# Patient Record
Sex: FEMALE | Race: WHITE | Employment: FULL TIME | URBAN - METROPOLITAN AREA
[De-identification: names, ages, dates, MRNs, and addresses within clinical notes are randomized per-mention and may not be internally consistent; named-entity substitution may affect disease eponyms.]

---

## 2017-07-18 LAB — HPV ABSTRACT: NORMAL

## 2018-07-23 LAB — PHQ9 SCORE: 0

## 2018-07-30 ENCOUNTER — TRANSFERRED RECORDS (OUTPATIENT)
Dept: HEALTH INFORMATION MANAGEMENT | Facility: CLINIC | Age: 43
End: 2018-07-30

## 2018-08-13 ENCOUNTER — MEDICAL CORRESPONDENCE (OUTPATIENT)
Dept: HEALTH INFORMATION MANAGEMENT | Facility: CLINIC | Age: 43
End: 2018-08-13

## 2018-08-23 ENCOUNTER — OFFICE VISIT (OUTPATIENT)
Dept: SURGERY | Facility: CLINIC | Age: 43
End: 2018-08-23
Payer: COMMERCIAL

## 2018-08-23 VITALS
HEART RATE: 62 BPM | DIASTOLIC BLOOD PRESSURE: 90 MMHG | HEIGHT: 68 IN | BODY MASS INDEX: 30.31 KG/M2 | SYSTOLIC BLOOD PRESSURE: 132 MMHG | WEIGHT: 200 LBS

## 2018-08-23 DIAGNOSIS — L72.3 SEBACEOUS CYST: Primary | ICD-10-CM

## 2018-08-23 PROCEDURE — 99203 OFFICE O/P NEW LOW 30 MIN: CPT | Performed by: SURGERY

## 2018-08-23 NOTE — PROGRESS NOTES
"[facility]  Surgery Consultation    CONSULTATION REQUESTED BY:  Yon Vance [phone]    HPI: This patient is a 43-year-old female referred by the above-mentioned provider for consultation regarding right lateral breast lump.  She has had no imaging associated with this.  She  states that she first noted this on July 4 prior to leaving on vacation.  She noticed a lump in the right lateral breast area along her bra line.  She states that she has had this in the past on at least 2 other occasions were small lumps have risen in this area and ultimately gone away without any therapy.  This morning however gotten larger and more tender.  There is a question of infection and she was treated with some antibiotics.  She was seen and evaluated also by her chiropractor and this time whereby they expressed infected appearing material from this area.  Since then this is gotten much smaller.  She has had no fevers or chills.  No other significant symptoms.    PMH:   has a past medical history of Thyroid disease.  PSH:    has a past surgical history that includes Abdomen surgery.  Social History:   reports that she has never smoked. She has never used smokeless tobacco.  Family History:  family history includes Coronary Artery Disease in her paternal grandfather; Hyperlipidemia in her father and paternal grandfather; Hypertension in her father, maternal grandfather, and paternal grandfather.  Medications/Allergies: Home medications and allergies reviewed.    ROS:  The 10 point Review of Systems is negative other than noted in the HPI.    Physical Exam:  /90  Pulse 62  Ht 5' 8\" (1.727 m)  Wt 200 lb (90.7 kg)  BMI 30.41 kg/m2  GENERAL: Generally appears well.  Psych: Alert and Oriented.  Normal affect  Eyes: Sclera clear  Respiratory:  Lungs clear to ausculation bilaterally with good air excursion  Cardiovascular:  Regular Rate and Rhythm with no murmurs gallops or rubs, normal peripheral " pulses  Area in question was examined in the far right lateral chest wall in essence separate from her breast there appears to be a healing area of what was likely an inclusion cyst.  There is a very small 2-3 mm palpable nodule in the area consistent with a resolving cyst.  There is no erythema or cellulitis.  Integumentary:  No rashes  Neurological: grossly intact    All new lab and imaging data was reviewed.     Impression and Plan:  Patient is a 43 year old female with likely sebaceous cyst post drainage    PLAN: We discussed options.  I stated that this could simply be observed or we could proceed with excision.  She at this time is comfortable with simple observation.  She was encouraged to call back should she notice redevelopment of a more defined mass or enlargement of the area.    Thank you for the consultation    Willy Guerrero MD    Please route or send letter to:  Primary Care Provider (PCP) and Referring Provider

## 2018-08-23 NOTE — MR AVS SNAPSHOT
"              After Visit Summary   2018    Dominique Arias    MRN: [id number]           Patient Information     Date Of Birth          1975        Visit Information        Provider Department      2018 10:30 AM Willy Guerrero MD [facility] General Surgery      Care Instructions    Patient to follow up with Primary Care provider regarding elevated blood pressure.          Follow-ups after your visit        Who to contact     If you have questions or need follow up information about today's clinic visit or your schedule please contact [facility] directly at [phone].  Normal or non-critical lab and imaging results will be communicated to you by Tiragiuhart, letter or phone within 4 business days after the clinic has received the results. If you do not hear from us within 7 days, please contact the clinic through [employer] or phone. If you have a critical or abnormal lab result, we will notify you by phone as soon as possible.  Submit refill requests through [email or web address] or call your pharmacy and they will forward the refill request to us. Please allow 3 business days for your refill to be completed.          Additional Information About Your Visit        MyChart Information     [email or web address] lets you send messages to your doctor, view your test results, renew your prescriptions, schedule appointments and more. To sign up, go to [email or web address] . Click on \"Log in\" on the left side of the screen, which will take you to the Welcome page. Then click on \"Sign up Now\" on the right side of the page.     You will be asked to enter the access code listed below, as well as some personal information. Please follow the directions to create your username and password.     Your access code is: [email or web address]  Expires: 2018 11:01 AM     Your access code will  in 90 days. If you need help or a new code, please call your [facility] clinic or " "[phone].        Care EveryWhere ID     This is your Care EveryWhere ID. This could be used by other organizations to access your [place] medical records  [id number]        Your Vitals Were     Pulse Height BMI (Body Mass Index)             62 5' 8\" (1.727 m) 30.41 kg/m2          Blood Pressure from Last 3 Encounters:   08/23/18 132/90    Weight from Last 3 Encounters:   08/23/18 200 lb (90.7 kg)              Today, you had the following     No orders found for display       Primary Care Provider Office Phone # Fax #    Yon Abigail Vance [phone]       [facility] [place] [address] [address] [place] [address]        Equal Access to Services     LORIE ESQUIVEL : Hadii aad ku hadasho Soomaali, waaxda luqadaha, qaybta kaalmada adeegyada, waxay idiin hayaan marcia chase . So [facility] [phone].    ATENCIÓN: Si habla español, tiene a shelton disposición servicios gratuitos de asistencia lingüística. [facility] [phone].    We comply with applicable federal civil rights laws and [place] laws. We do not discriminate on the basis of race, color, national origin, age, disability, sex, sexual orientation, or gender identity.            Thank you!     Thank you for choosing [facility]  for your care. Our goal is always to provide you with excellent care. Hearing back from our patients is one way we can continue to improve our services. Please take a few minutes to complete the written survey that you may receive in the mail after your visit with us. Thank you!             Your Updated Medication List - Protect others around you: Learn how to safely use, store and throw away your medicines at www.disposemymeds.org.          This list is accurate as of 8/23/18 11:01 AM.  Always use your most recent med list.                   Brand Name Dispense Instructions for use Diagnosis    UNABLE TO FIND      MEDICATION NAME: Thyrosol        VITAMIN D (CHOLECALCIFEROL) PO      Take " by mouth daily

## 2018-08-23 NOTE — LETTER
"2018    RE: Dominique Arias, : 1975      CONSULTATION REQUESTED BY:  Yon Vance [phone]     HPI: This patient is a 43-year-old female referred by the above-mentioned provider for consultation regarding right lateral breast lump.  She has had no imaging associated with this.  She  states that she first noted this on  prior to leaving on vacation.  She noticed a lump in the right lateral breast area along her bra line.  She states that she has had this in the past on at least 2 other occasions were small lumps have risen in this area and ultimately gone away without any therapy.  This morning however gotten larger and more tender.  There is a question of infection and she was treated with some antibiotics.  She was seen and evaluated also by her chiropractor and this time whereby they expressed infected appearing material from this area.  Since then this is gotten much smaller.  She has had no fevers or chills.  No other significant symptoms.     PMH:   has a past medical history of Thyroid disease.  PSH:    has a past surgical history that includes Abdomen surgery.  Social History:   reports that she has never smoked. She has never used smokeless tobacco.  Family History:  family history includes Coronary Artery Disease in her paternal grandfather; Hyperlipidemia in her father and paternal grandfather; Hypertension in her father, maternal grandfather, and paternal grandfather.  Medications/Allergies: Home medications and allergies reviewed.     ROS:  The 10 point Review of Systems is negative other than noted in the HPI.     Physical Exam:  /90  Pulse 62  Ht 5' 8\" (1.727 m)  Wt 200 lb (90.7 kg)  BMI 30.41 kg/m2  GENERAL: Generally appears well.  Psych: Alert and Oriented.  Normal affect  Eyes: Sclera clear  Respiratory:  Lungs clear to ausculation bilaterally with good air excursion  Cardiovascular:  Regular Rate and Rhythm with no murmurs gallops or rubs, normal " peripheral pulses  Area in question was examined in the far right lateral chest wall in essence separate from her breast there appears to be a healing area of what was likely an inclusion cyst.  There is a very small 2-3 mm palpable nodule in the area consistent with a resolving cyst.  There is no erythema or cellulitis.  Integumentary:  No rashes  Neurological: grossly intact     All new lab and imaging data was reviewed.      Impression and Plan:  Patient is a 43 year old female with likely sebaceous cyst post drainage     PLAN: We discussed options.  I stated that this could simply be observed or we could proceed with excision.  She at this time is comfortable with simple observation.  She was encouraged to call back should she notice redevelopment of a more defined mass or enlargement of the area.     Thank you for the consultation     Willy Guerrero MD

## 2018-08-23 NOTE — NURSING NOTE
Breast Patients    BREAST PATIENTS (ALL)    1-Do you have any of the following symptoms? Breast Pain and Lump(s) or Mass(es)  2-In which breast are you having the symptoms? right  3-Do you use hormones?  No  4-Have you had a Mammogram? Yes  Where: [facility]  Date: 2016  5-Have you ever had a breast cyst drained? No  6-Have you ever had a breast biopsy? No  7-Have you ever had a Breast Cancer? No   8-Is there a history of Breast Cancer in your family? No  9-Have you ever had Ovarian Cancer? No  10-Is there a history of Ovarian Cancer in your family? No  11-Summarize your caffeine intake (i.e. coffee, tea, chocolate, soda etc.): 2 cups daily    BREAST PATIENTS (FEMALE)    12-What age did your periods begin? 12  13-Date your last menstrual period began? 8/18  14-Number of full-term pregnancies: 2  15-Your age when your first child was born? 32  16-Did you nurse your children? Yes  17-Are you pregnant now? No  18-Have you begun menopause? No  19-Have you had either ovary removed?No  20-Do you have breast implants? No

## 2019-02-01 LAB
ALBUMIN SERPL-MCNC: 4.5 G/DL (ref 3.6–5.1)
ALP SERPL-CCNC: 42 U/L (ref 33–115)
ALT SERPL-CCNC: 11 U/L (ref 6–29)
AST SERPL-CCNC: 16 U/L (ref 10–30)
BILIRUB SERPL-MCNC: 0.6 MG/DL (ref 0.2–1.2)
BUN SERPL-MCNC: 16 MG/DL (ref 7–25)
CALCIUM SERPL-MCNC: 9.2 MG/DL (ref 8.6–10.2)
CHLORIDE SERPLBLD-SCNC: 103 MMOL/L (ref 98–110)
CHOLEST SERPL-MCNC: 206 MG/DL
CO2 SERPL-SCNC: 29 MMOL/L (ref 20–32)
CREAT SERPL-MCNC: 0.86 MG/DL (ref 0.5–1.1)
GFR SERPL CREATININE-BSD FRML MDRD: 82 ML/MIN/1.73M2
GLUCOSE SERPL-MCNC: 87 MG/DL (ref 65–99)
HBA1C MFR BLD: 5.3 % (ref 0–5.7)
HDLC SERPL-MCNC: 61 MG/DL
HOMOCYSTINE: 7.6
LDLC SERPL CALC-MCNC: 120 MG/DL
NONHDLC SERPL-MCNC: 145 MG/DL
POTASSIUM SERPL-SCNC: 3.9 MMOL/L (ref 3.5–5.3)
PROT SERPL-MCNC: 6.9 G/DL (ref 6.1–8.1)
SODIUM SERPL-SCNC: 138 MMOL/L (ref 135–146)
TRIGL SERPL-MCNC: 142 MG/DL
TSH SERPL-ACNC: 18.98 MIU/L
VITAMIN D, 25-OH, TOTAL - QUEST: 56

## 2019-04-05 LAB — TSH SERPL-ACNC: 0.25 MIU/L

## 2019-05-21 ENCOUNTER — TRANSFERRED RECORDS (OUTPATIENT)
Dept: HEALTH INFORMATION MANAGEMENT | Facility: CLINIC | Age: 44
End: 2019-05-21

## 2019-05-21 ENCOUNTER — PRE VISIT (OUTPATIENT)
Dept: CARDIOLOGY | Facility: CLINIC | Age: 44
End: 2019-05-21

## 2019-05-21 LAB — TSH SERPL-ACNC: 23.17 MIU/L

## 2019-05-22 ENCOUNTER — TELEPHONE (OUTPATIENT)
Dept: CARDIOLOGY | Facility: CLINIC | Age: 44
End: 2019-05-22

## 2019-05-22 DIAGNOSIS — E06.3 HASHIMOTO'S THYROIDITIS: ICD-10-CM

## 2019-05-22 NOTE — TELEPHONE ENCOUNTER
[facility] Call Center    Phone Message    May a detailed message be left on voicemail: yes    Reason for Call: Other: Pt called back to do the previsit questions.  Aneta was at lunch.  Please call pt back.     Action Taken: Message routed to:  Adult Clinics: Cardiology p [id number]

## 2019-05-23 NOTE — TELEPHONE ENCOUNTER
Called and left message for patient. Appointment is with Dr Burns tomorrow, left address for her. Advised her that I was calling to be sure she knew the address, and to see if she had any outside cardiac records.     DBaADELITA sherman

## 2019-05-23 NOTE — TELEPHONE ENCOUNTER
[facility] Call Center    Phone Message    May a detailed message be left on voicemail: yes    Reason for Call: Other: Please call before 10:30AM on her cell phone. 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

## 2019-05-24 ENCOUNTER — OFFICE VISIT (OUTPATIENT)
Dept: CARDIOLOGY | Facility: CLINIC | Age: 44
End: 2019-05-24
Payer: COMMERCIAL

## 2019-05-24 VITALS
OXYGEN SATURATION: 98 % | BODY MASS INDEX: 32.71 KG/M2 | HEART RATE: 72 BPM | DIASTOLIC BLOOD PRESSURE: 81 MMHG | TEMPERATURE: 98.5 F | RESPIRATION RATE: 18 BRPM | HEIGHT: 68 IN | SYSTOLIC BLOOD PRESSURE: 118 MMHG | WEIGHT: 215.8 LBS

## 2019-05-24 DIAGNOSIS — I49.9 CARDIAC ARRHYTHMIA, UNSPECIFIED CARDIAC ARRHYTHMIA TYPE: Primary | ICD-10-CM

## 2019-05-24 PROBLEM — E06.3 HASHIMOTO'S THYROIDITIS: Status: ACTIVE | Noted: 2019-05-24

## 2019-05-24 PROCEDURE — 93000 ELECTROCARDIOGRAM COMPLETE: CPT | Performed by: INTERNAL MEDICINE

## 2019-05-24 PROCEDURE — 99204 OFFICE O/P NEW MOD 45 MIN: CPT | Performed by: INTERNAL MEDICINE

## 2019-05-24 ASSESSMENT — MIFFLIN-ST. JEOR: SCORE: 1672.36

## 2019-05-24 ASSESSMENT — PAIN SCALES - GENERAL: PAINLEVEL: NO PAIN (0)

## 2019-05-24 NOTE — PATIENT INSTRUCTIONS
The following is a summary of your office visit:    Medications started today:none    Medications stopped today: none    Medication dose change: none    Nurse contact information: Aneta Velazquez RN  Cardiology Care Coordinator  [phone] Phone  [phone] Fax    Appointments made today: none    Patient instructions: See PCP for thyroid treatment    If you are still having skipped beats in three months or so, please call us about a heart monitor      If you have had any blood work, imaging or other testing completed we will be in touch within 1-2 weeks regarding the results. If you have any questions, concerns or need to schedule a follow up, please contact us at [phone]. If you are needing refills please contact your pharmacy. For urgent after hour care please call the [facility] at [phone] or the [facility] at [phone] and ask to speak to the cardiologist on call.    It was a pleasure meeting with you today. Please let us know if there is anything else we can do for you so that we can be sure you are leaving completely satisfied with your care experience.     Your Cardiology Team at [facility]  RN Care Coordinator: Aneta  CMA: Sophie              Patient Education     Understanding Premature Ventricular Contractions (PVCs)  Premature ventricular contractions (PVCs) are a type of abnormal heartbeat (arrhythmia). They are common ly found in people of all ages.    How PVCs happen    Your heart has 4 chambers: 2 upper atria and 2 lower ventricles. Normally, a special group of cells begins the signal to start your heartbeat. These cells are in the sinoatrial (SA) node in the right atrium. The signal quickly travels down your heart s conducting system. It travels to the left and right ventricle. As it travels, the signal triggers nearby parts of your heart to contract. This allows your heart to squeeze in a coordinated way.  During a premature  ventricular contraction, the signal to start your heartbeat instead comes from one of the ventricles. This signal is premature, meaning it happens before the SA node has had a chance to fire. The signal spreads through the rest of your heart, causing a heartbeat. If this happens very soon after the previous heartbeat, your heart will push out very little blood. This causes a feeling of a pause between beats. If it happens a little later, your heart pushes out an almost normal amount of blood. This leads to a feeling of an extra heartbeat. So, the heart has a  premature  heartbeat in between normal heartbeats.  What causes PVCs?  Certain things can help set off a premature signal in the ventricles. These include:    Advancing age    Reduced blood flow to your heart (such as coronary artery disease)    Scarring after a heart attack    Electrolyte problems, such as low sodium or potassium levels    Increased adrenaline, such as with anxiety    Certain medicines, like digoxin  Many heart conditions raise the risk for PVCs. These include:    Mitral valve prolapse    High blood pressure    Heart attack    Coronary heart disease    Dilated cardiomyopathy    Hypertrophic cardiomyopathy    Congenital heart disease    Heart failure  They often happen in people without any heart disease. However, PVCs are somewhat more common in people with some kind of heart disease.   What are the symptoms of PVCs?  Most people with occasional PVCs don t have symptoms. The more PVCs you have, the more likely you are to feel them. When symptoms do happen, they are usually minor. Symptoms may include:    An awareness of the heart beating    A fluttering or flip-flop feeling in your chest    Feeling of a  skipped  or  extra  heartbeat    Dizziness and near-fainting    A pulsing sensation in the neck  PVCs may cause more severe symptoms if you have another heart problem, such as heart failure.   How are PVCs diagnosed?  Your healthcare provider  will ask about your medical history and give you a physical exam. An electrocardiogram (ECG) is the main test for diagnosis. This test records the electrical activity of your heart. During an ECG, small pads (electrodes) are placed on your chest, arms, and legs. Wires connect the pads to a machine, which records your heart s electrical signals. This test allows your provider to look at the signal of your heartbeat for a brief time. Any PVCs that occur during this time will show up on the ECG. In some cases, your healthcare provider might advise ECG monitoring over a day or more, up to 30 days. This can help to catch PVCs that don t happen often. This is done with a monitor you wear night and day for the test period. Heart monitor. There are 2 types of external heart monitors:    Holter monitor. This monitor can be worn for 1 to 7 days. It provides a constant recording of heart activity. After the test is done, your health care provider analyzes the recording.    Event monitors. These monitors can be used for 3 to 4 weeks. One kind is a memory loop recorder. This monitor records constantly, but stores the recording only when you press a button. The other kind is a credit card-sized recorder. This monitor is turned on only during an episode. With both types, you send the recordings of symptoms to your health care provider over the phone.  These may be the only tests your healthcare provider will need. You may need more testing if you have PVCs often, or many in a row. Your provider may look at other causes, including possible heart problems. These tests might include:    Echocardiography. This test looks at your heart s structure and function.    Cardiac stress testing. This test checks how your heart responds to exercise and to evaluate heart artery blood flow.    Cardiac CT or MRI     Blood tests. This is done to check potassium and thyroid levels.  Date Last Reviewed: 11/1/2017 2000-2018 [employer],  [facility]. [address] [place] [address]. All rights reserved. This information is not intended as a substitute for professional medical care. Always follow your healthcare professional's instructions.

## 2019-05-24 NOTE — PROGRESS NOTES
[facility] Cardiology Consultation:    Assessment and Plan:     1.  Hypothyroidism: I urged her to follow-up with her primary care physician about this as this is likely responsible for her palpitations.    2. Palpitations: She describes symptoms that are typical for premature beats, and was reassured about this.  She has no signs or symptoms of any concerning heart disease.  We discussed potentially doing a cardiac monitor.  Finally decided to defer for now, and get her hypothyroidism treated.  She will connect with us in the future if she is still having palpitations and wishes to pursue a monitor.  3.  Primary prevention, good lipid profile: She was reassured, and I asked her to continue with her current lifestyle.      Ulises Burns MD    Cardiac Imaging and Prevention  [facility]  [email or web address] Pager: [phone]    HPI: Self-referred for palpitations.  Medical history is significant for Hashimoto's thyroiditis and hypothyroidism.  She thinks she was diagnosed with this about 10 years ago.  Initially she saw an endocrinologist and was on Synthroid.  She tells me that her thyroid profile improved but she did not feel good.  Since then, she has followed with a naturopath, and has taken herbal thyroid supplements.  She brings in recent blood work that was reviewed by me.  It shows poorly controlled thyroid function, with elevated TSH in the 20s.  Other labs are pertinent for normal basic labs, normal high-sensitivity CRP, normal hemoglobin A1c and normal vitamin D.  Lipid profile shows a HDL cholesterol in the 60s, normal triglycerides, and LDL cholesterol of 120.  There is no family history of premature heart disease.  She thinks that her father may have had atrial fibrillation.  She describes palpitations that started a few months ago, coinciding with her recent thyroid derangement.  She describes a feeling of fluttering in her chest that lasts for a few  "seconds, followed by feeling of strong heartbeat.  She notices this most often in the evenings or when she is laying in bed.  Sometimes, this is accompanied by a feeling of flushing in her chest.  She does not get the symptoms on exertion.  She reports feeling quite tired recently, but despite this she has continued to be active.  She does exercise, using a stationary bike and light weights at home.  She does not feel any symptoms during exercise.    EKG done in clinic today was reviewed by me.  It shows a normal sinus rhythm at 65 bpm, with no concerning changes.    EXAM:  /81 (BP Location: Left arm, Patient Position: Sitting, Cuff Size: Adult Large)   Pulse 72   Temp 98.5  F (36.9  C) (Oral)   Resp 18   Ht 1.727 m (5' 8\")   Wt 97.9 kg (215 lb 12.8 oz)   SpO2 98%   BMI 32.81 kg/m    GEN/CONSTITUIONAL: Appears comfortable, in no apparent distress   EYES: No icterus  ENT/MOUTH: Normal  JVP:  Not visible  RESPIRATORY: Clear to auscultation bilaterally   CARDIOVASCULAR: Regular S1 and S2, no murmurs, rubs, or gallops.   ABDOMEN: Soft, non-tender, positive bowel sounds   NEUROLOGIC: Grossly non-focal   PSYCHIATRIC: Normal affect  EXT: No cyanosis, clubbing, edema. Normal pedal pulses.  Skin: No petechiae, purpura or rash    PAST MEDICAL HISTORY:  Past Medical History:   Diagnosis Date     Thyroid disease        CURRENT MEDICATIONS:  Current Outpatient Medications   Medication     UNABLE TO FIND     UNABLE TO FIND     VITAMIN D, CHOLECALCIFEROL, PO     UNABLE TO FIND     No current facility-administered medications for this visit.        PAST SURGICAL HISTORY:  Past Surgical History:   Procedure Laterality Date     ABDOMEN SURGERY         ALLERGIES     Allergies   Allergen Reactions     Amoxicillin Hives     Sulfa Drugs Itching       FAMILY HISTORY:  Family History   Problem Relation Age of Onset     Hypertension Father      Hyperlipidemia Father      Hypertension Maternal Grandfather      Hypertension " Paternal Grandfather      Coronary Artery Disease Paternal Grandfather      Hyperlipidemia Paternal Grandfather        SOCIAL HISTORY:  Social History     Socioeconomic History     Marital status:      Spouse name: Not on file     Number of children: Not on file     Years of education: Not on file     Highest education level: Not on file   Occupational History     Not on file   Social Needs     Financial resource strain: Not on file     Food insecurity:     Worry: Not on file     Inability: Not on file     Transportation needs:     Medical: Not on file     Non-medical: Not on file   Tobacco Use     Smoking status: Never Smoker     Smokeless tobacco: Never Used   Substance and Sexual Activity     Alcohol use: Not on file     Drug use: Not on file     Sexual activity: Not on file   Lifestyle     Physical activity:     Days per week: Not on file     Minutes per session: Not on file     Stress: Not on file   Relationships     Social connections:     Talks on phone: Not on file     Gets together: Not on file     Attends Muslim service: Not on file     Active member of club or organization: Not on file     Attends meetings of clubs or organizations: Not on file     Relationship status: Not on file     Intimate partner violence:     Fear of current or ex partner: Not on file     Emotionally abused: Not on file     Physically abused: Not on file     Forced sexual activity: Not on file   Other Topics Concern     Parent/sibling w/ CABG, MI or angioplasty before 65F 55M? Not Asked   Social History Narrative     Not on file       ROS:   Constitutional: No fever, chills, or sweats. No weight gain/loss   ENT: No visual disturbance, ear ache, epistaxis, sore throat  Allergies/Immunologic: Negative.   Respiratory: No cough, hemoptysia  Cardiovascular: As per HPI  GI: No nausea, vomiting, hematemesis, melena, or hematochezia  : No urinary frequency, dysuria, or hematuria  Integument: Negative  Psychiatric:  Negative  Neuro: Negative  Endocrinology: Negative   Musculoskeletal: Negative    ADDITIONAL COMMENTS:     I reviewed the patient's medications:     I reviewed the patient's pertinent clinical laboratory studies:     I reviewed the patient's pertinent imaging studies:   I reviewed the patient's ECG:

## 2019-05-24 NOTE — TELEPHONE ENCOUNTER
"PREVISIT INFORMATION                                                    Dominique SABI Arias scheduled for future visit at [facility] specialty clinics.    Patient is scheduled to see Katy on 5/24  Reason for visit: Flutter, \"heavy contraction\", chest flushing up neck  Referring provider none  Has patient seen previous specialist? No  Medical Records:  Available in chart.  Patient was previously seen at a [place] or [facility] facility.    REVIEW                                                      New patient packet mailed to patient: No  Medication reconciliation complete: No      Current Outpatient Medications   Medication Sig Dispense Refill     UNABLE TO FIND MEDICATION NAME: Thyrosol       VITAMIN D, CHOLECALCIFEROL, PO Take by mouth daily         Allergies: Amoxicillin and Sulfa drugs        PLAN/FOLLOW-UP NEEDED                                                      Previsit review complete.  Patient will see provider at future scheduled appointment.     Patient Reminders Given:  Please, make sure you bring an updated list of your medications.   Clinic location reviewed.  If you need to cancel or reschedule,please call [phone].    Aneta Velazquez  "

## 2019-05-24 NOTE — NURSING NOTE
"Dominique Arias's goals for this visit include:   Chief Complaint   Patient presents with     Consult     Possible arrhythmia per pt        She requests these members of her care team be copied on today's visit information: Yes    PCP: Yon Vance    Referring Provider:  No referring provider defined for this encounter.    /81 (BP Location: Left arm, Patient Position: Sitting, Cuff Size: Adult Large)   Pulse 72   Temp 98.5  F (36.9  C) (Oral)   Resp 18   Ht 1.727 m (5' 8\")   Wt 97.9 kg (215 lb 12.8 oz)   SpO2 98%   BMI 32.81 kg/m      Do you need any medication refills at today's visit? No    Mickey Steward CMA (AAMA)      "

## 2019-07-02 ENCOUNTER — TELEPHONE (OUTPATIENT)
Dept: CARDIOLOGY | Facility: CLINIC | Age: 44
End: 2019-07-02

## 2019-07-02 NOTE — TELEPHONE ENCOUNTER
Called and left a message for patient. She had brought in outside results from [facility] for her appt with Dr Burns and left them with him. Need to know if she needs them back or if she already has a copy. Asked her to call back.      ADELITA Hare

## 2019-07-03 NOTE — TELEPHONE ENCOUNTER
Left second message for patient asking her to call back about her outside results that she had given to Dr Burns.     ADELITA Hare

## 2019-07-08 NOTE — TELEPHONE ENCOUNTER
Called and spoke to patient , she said the results were a copy and she does not need them back.   Will abstract and send to scanning.   ADELITA Hare

## 2020-03-10 ENCOUNTER — OFFICE VISIT (OUTPATIENT)
Dept: VASCULAR SURGERY | Facility: CLINIC | Age: 45
End: 2020-03-10
Payer: COMMERCIAL

## 2020-03-10 DIAGNOSIS — I83.813 VARICOSE VEINS OF BILATERAL LOWER EXTREMITIES WITH PAIN: Primary | ICD-10-CM

## 2020-03-10 PROCEDURE — 99207 ZZC VEINSOLUTIONS FREE SCREENING: CPT | Performed by: SURGERY

## 2020-03-10 SDOH — HEALTH STABILITY: MENTAL HEALTH: HOW OFTEN DO YOU HAVE A DRINK CONTAINING ALCOHOL?: NEVER

## 2020-03-10 NOTE — LETTER
3/10/2020         RE: Dominique Arias  [address] [place] [address]        Dear Colleague,    Thank you for referring your patient, Dominique Arias, to the [facility]. Please see a copy of my visit note below.    [facility] Free screening    Dominique Arias is a 45-year-old female who presents with a 16-year history of slowly enlarging bilateral lower extremity varicose veins and increasing leg pain.  She feels that the veins have been more painful over the last year.  She describes the pain as an aching, tiredness and heaviness, worse toward the end the day, causing excessive fatigue in her legs.  She admits to daily swelling with sock lines on her distal legs.    She has worn compression hose for about 1 month but has not found significant relief with them.  The swelling is of concern to her.    Family history significant for varicose veins in her mother who had them stripped decades ago and varicose veins in a paternal aunt.    She has no history of deep vein thrombosis, superficial thrombophlebitis or trauma to her legs.    12 point review of systems was completed and was reviewed.  Is significant for 20 pound weight gain, fatigue, leg swelling and Graves' disease    Pathophysiology of venous insufficiency was discussed and options of continued conservative measures with use of compression, elevation and dietary measures as well as exercise and weight loss were discussed.  We discussed the option of obtaining a venous ultrasound to assess for valve incompetence.  The patient wishes to pursue venous ultrasound.    She will return in the near future.    [facility] NEW PATIENT:                Again, thank you for allowing me to participate in the care of your patient.        Sincerely,        Tito Mena MD

## 2020-03-10 NOTE — PROGRESS NOTES
[facility]    Dominique Arias is a 45-year-old female who presents with a 16-year history of slowly enlarging bilateral lower extremity varicose veins and increasing leg pain.  She feels that the veins have been more painful over the last year.  She describes the pain as an aching, tiredness and heaviness, worse toward the end the day, causing excessive fatigue in her legs.  She admits to daily swelling with sock lines on her distal legs.    She has worn compression hose for about 1 month but has not found significant relief with them.  The swelling is of concern to her.    Family history significant for varicose veins in her mother who had them stripped decades ago and varicose veins in a paternal aunt.    She has no history of deep vein thrombosis, superficial thrombophlebitis or trauma to her legs.    12 point review of systems was completed and was reviewed.  Is significant for 20 pound weight gain, fatigue, leg swelling and Graves' disease    Pathophysiology of venous insufficiency was discussed and options of continued conservative measures with use of compression, elevation and dietary measures as well as exercise and weight loss were discussed.  We discussed the option of obtaining a venous ultrasound to assess for valve incompetence.  The patient wishes to pursue venous ultrasound.    She will return in the near future.    [facility]

## 2021-07-20 ENCOUNTER — TRANSFERRED RECORDS (OUTPATIENT)
Dept: HEALTH INFORMATION MANAGEMENT | Facility: CLINIC | Age: 46
End: 2021-07-20

## 2021-07-20 ENCOUNTER — MEDICAL CORRESPONDENCE (OUTPATIENT)
Dept: HEALTH INFORMATION MANAGEMENT | Facility: CLINIC | Age: 46
End: 2021-07-20

## 2021-07-23 ENCOUNTER — OFFICE VISIT (OUTPATIENT)
Dept: SURGERY | Facility: CLINIC | Age: 46
End: 2021-07-23
Payer: COMMERCIAL

## 2021-07-23 VITALS
OXYGEN SATURATION: 99 % | HEIGHT: 68 IN | DIASTOLIC BLOOD PRESSURE: 72 MMHG | WEIGHT: 235 LBS | SYSTOLIC BLOOD PRESSURE: 104 MMHG | HEART RATE: 78 BPM | BODY MASS INDEX: 35.61 KG/M2

## 2021-07-23 DIAGNOSIS — N60.81 CYST OF SKIN OF BREAST, RIGHT: Primary | ICD-10-CM

## 2021-07-23 PROCEDURE — 99203 OFFICE O/P NEW LOW 30 MIN: CPT | Performed by: SURGERY

## 2021-07-23 RX ORDER — LEVOTHYROXINE SODIUM 125 UG/1
TABLET ORAL
COMMUNITY
End: 2022-08-02

## 2021-07-23 ASSESSMENT — MIFFLIN-ST. JEOR: SCORE: 1749.45

## 2021-07-23 NOTE — LETTER
July 26, 2021          Natalie Agrawal MD  [facility]  [address]  [place] [address]      RE:   Dominique Arias 1975      Dear Colleague,    Thank you for referring your patient, Dominique Arias, to [facility] [place] location. Please see a copy of my visit note below.    [facility] General Surgery Clinic Consultation     CHIEF COMPLAINT:       Chief Complaint   Patient presents with     Consult       Right breast cyst         HISTORY OF PRESENT ILLNESS:  Dominique Arias is a 46 year old female who is seen in consultation at the request of Dr. Agrawal for evaluation of right breast cyst.  The patient reports that she has a symptomatic lesion present to the lateral aspect of the right inframammary area.  The lesion became irritated approximately 10 to 14 days ago.  The lesion has increased in size.  There is associated erythema but no drainage.  No systemic symptoms.  No fevers or chills.  The patient did experience similar symptoms approximately 3 years ago.  The swelling at the site resolved on its own.  Since that time, the patient has noticed an approximately pea-sized subcutaneous lesion that has persisted.  Patient reports that her last mammogram was in 2018.  No family history of breast cancer.     REVIEW OF SYSTEMS:  Constitutional: No fevers or chills  Eyes: No blurred or double vision  HENT: Denies headaches, No rhinorrhea, No sore throat  Respiratory: No cough or shortness of breath  Cardiovascular: Denies chest pain or palpitations  Gastrointestinal: No abdominal pain or nausea/vomiting  Genitourinary: No hematuria or dysuria  Musculoskeletal: Denies arthralgias or myalgias  Neurologic: No numbness or tingling  Integumentary: No skin rashes     Past Medical History        Past Medical History:   Diagnosis Date     Thyroid disease              Past Surgical History         Past Surgical History:   Procedure Laterality Date     ABDOMEN SURGERY         "        Family History         Family History   Problem Relation Age of Onset     Hypertension Father       Hyperlipidemia Father       Hypertension Maternal Grandfather       Hypertension Paternal Grandfather       Coronary Artery Disease Paternal Grandfather       Hyperlipidemia Paternal Grandfather              Social History           Tobacco Use     Smoking status: Never Smoker     Smokeless tobacco: Never Used   Substance Use Topics     Alcohol use: Never             Patient Active Problem List   Diagnosis     Hashimoto's thyroiditis              Allergies   Allergen Reactions     Amoxicillin Hives     Sulfa Drugs Itching         Current Outpatient Prescriptions          Current Outpatient Medications   Medication Sig Dispense Refill     levothyroxine (SYNTHROID/LEVOTHROID) 125 MCG tablet levothyroxine 125 mcg tablet   TAKE 1 TABLET (125 MCG) BY ORAL ROUTE ONCE DAILY ON AN EMPTY STOMACH 30 MINUTES BEFORE BREAKFAST         UNABLE TO FIND MEDICATION NAME: Pt states on today visit that is currently taking this medicine XYMOGEN T-150 which is was suggested by her chiropractic for thyroid purposes stated. (Patient not taking: Reported on 7/23/2021)         UNABLE TO FIND MEDICATION NAME: Pt states on today visit that is currently taking this medicine XYMOGEN MedCaps T3 which is was suggested by her chiropractic for thyroid purposes stated. (Patient not taking: Reported on 7/23/2021)         UNABLE TO FIND MEDICATION NAME: Thyrosol (Patient not taking: Reported on 7/23/2021)         VITAMIN D, CHOLECALCIFEROL, PO Take by mouth daily (Patient not taking: Reported on 7/23/2021)                Vitals: /72 (BP Location: Left arm, Patient Position: Sitting, Cuff Size: Adult Large)   Pulse 78   Ht 1.727 m (5' 8\")   Wt 106.6 kg (235 lb)   SpO2 99%   BMI 35.73 kg/m    BMI= Body mass index is 35.73 kg/m .     EXAM:  General: Vital signs reviewed, in no apparent distress  Eyes: Anicteric  HENT: Normocephalic, " atraumatic, trachea midline   Respiratory: Breathing nonlabored  Cardiovascular: Regular rate and rhythm  Breast: Approximately 3 cm tender, cystic skin lesion of the lateral aspect of the right inframammary fold without evidence of expressible drainage, no significant surrounding cellulitis  Musculoskeletal: No gross deformities  Neurologic: Grossly nonfocal exam  Psychiatric: Normal mood, affect and insight  Integumentary: Warm and dry     PROCEDURE:  The area overlying the patient's right inframammary lateral breast lesion was prepped with ChloraPrep.  The area was then infiltrated with lidocaine local anesthetic.  The 11 blade scalpel was used to make a cruciate skin incision overlying the center of the lesion.  The underlying contents, consistent with sebaceous cyst material, were evacuated using gentle pressure.  The deep aspect of the wound cavity was infiltrated with local anesthetic.  Hemostasis was applied using gentle pressure.  The wound cavity was packed with quarter inch iodoform packing strip.  Sterile dressing was applied.  Patient tolerated the procedure well.     ASSESSMENT:  Dominique Arias is a 46 year old who presents with right breast subcutaneous cystic lesion.  Significant pertinent co-morbidities include: Obesity.         PLAN:  The patient's right breast subcutaneous cystic lesion was incised and drained today in clinic.  The patient tolerated this well.  She was provided with wound care supplies and instructed to perform daily wound packing changes until her wound completely heals.  Once the patient's right breast process has resolved and the underlying subcutaneous lesion has returned to its previous size, I have recommended definitive surgical excision to prevent future need for incision and drainage.  Patient is in agreement with this plan.  The procedure will be performed in the endoscopy procedure unit under local anesthetic.  The patient was instructed to contact my clinic directly  when her right breast lesion is back to baseline and she is ready to proceed with definitive excision.  I have also encouraged the patient to resume annual screening mammograms.  She voices understanding.     It was my pleasure to participate in the care of Dominique Arias in clinic today. Thank you for this consultation.          Again, thank you for allowing me to participate in the care of your patient.      Sincerely,      Halina Sevilla MD

## 2021-07-23 NOTE — NURSING NOTE
Breast Patients    BREAST PATIENTS (ALL)    1-Do you have any of the following symptoms? Lump(s) or Mass(es)  2-In which breast are you having the symptoms? right  3-Have you had a Mammogram? Other Location:  [facility]    -  Date:  2016?  4-Have you ever had a breast cyst drained? No  5-Have you ever had a breast biopsy? No  6-Have you ever had a Breast Cancer? No   7-Is there a history of Breast Cancer in your family? No  8-Have you ever had Ovarian Cancer? No  9-Is there a history of Ovarian Cancer in your family? No  10-Summarize your caffeine intake (i.e. coffee, tea, chocolate, soda etc.): 2 cups of coffee daily    BREAST PATIENTS (FEMALE)    11-What age did your periods begin? 12  12-Date your last menstrual period began? 7/6/2021  13-Number of full-term pregnancies: 2  14-Your age when your first child was born? 32  15-Did you nurse your children? Yes  16-Are you pregnant now? No  17-Have you begun menopause? No  18-Have you had either ovary removed?No  19-Do you have breast implants? No   20-Do you use hormone replacement therapy?  No  21-Have you taken oral contraceptive pills?  Yes, For how many years?  8  22-Have you had an intrauterine device (IUD) placed?  Yes, For how many years?  2    23-What is your current bra size?  38D

## 2021-07-23 NOTE — PROGRESS NOTES
[facility] General Surgery [facility] Consultation    CHIEF COMPLAINT:  Chief Complaint   Patient presents with     Consult     Right breast cyst       HISTORY OF PRESENT ILLNESS:  Dominique Arias is a 46 year old female who is seen in consultation at the request of Dr. Agrawal for evaluation of right breast cyst.  The patient reports that she has a symptomatic lesion present to the lateral aspect of the right inframammary area.  The lesion became irritated approximately 10 to 14 days ago.  The lesion has increased in size.  There is associated erythema but no drainage.  No systemic symptoms.  No fevers or chills.  The patient did experience similar symptoms approximately 3 years ago.  The swelling at the site resolved on its own.  Since that time, the patient has noticed an approximately pea-sized subcutaneous lesion that has persisted.  Patient reports that her last mammogram was in 2018.  No family history of breast cancer.    REVIEW OF SYSTEMS:  Constitutional: No fevers or chills  Eyes: No blurred or double vision  HENT: Denies headaches, No rhinorrhea, No sore throat  Respiratory: No cough or shortness of breath  Cardiovascular: Denies chest pain or palpitations  Gastrointestinal: No abdominal pain or nausea/vomiting  Genitourinary: No hematuria or dysuria  Musculoskeletal: Denies arthralgias or myalgias  Neurologic: No numbness or tingling  Integumentary: No skin rashes    Past Medical History:   Diagnosis Date     Thyroid disease        Past Surgical History:   Procedure Laterality Date     ABDOMEN SURGERY         Family History   Problem Relation Age of Onset     Hypertension Father      Hyperlipidemia Father      Hypertension Maternal Grandfather      Hypertension Paternal Grandfather      Coronary Artery Disease Paternal Grandfather      Hyperlipidemia Paternal Grandfather        Social History     Tobacco Use     Smoking status: Never Smoker     Smokeless tobacco: Never Used   Substance Use Topics      "Alcohol use: Never       Patient Active Problem List   Diagnosis     Hashimoto's thyroiditis       Allergies   Allergen Reactions     Amoxicillin Hives     Sulfa Drugs Itching       Current Outpatient Medications   Medication Sig Dispense Refill     levothyroxine (SYNTHROID/LEVOTHROID) 125 MCG tablet levothyroxine 125 mcg tablet   TAKE 1 TABLET (125 MCG) BY ORAL ROUTE ONCE DAILY ON AN EMPTY STOMACH 30 MINUTES BEFORE BREAKFAST       UNABLE TO FIND MEDICATION NAME: Pt states on today visit that is currently taking this medicine XYMOGEN T-150 which is was suggested by her chiropractic for thyroid purposes stated. (Patient not taking: Reported on 7/23/2021)       UNABLE TO FIND MEDICATION NAME: Pt states on today visit that is currently taking this medicine XYMOGEN MedCaps T3 which is was suggested by her chiropractic for thyroid purposes stated. (Patient not taking: Reported on 7/23/2021)       UNABLE TO FIND MEDICATION NAME: Thyrosol (Patient not taking: Reported on 7/23/2021)       VITAMIN D, CHOLECALCIFEROL, PO Take by mouth daily (Patient not taking: Reported on 7/23/2021)         Vitals: /72 (BP Location: Left arm, Patient Position: Sitting, Cuff Size: Adult Large)   Pulse 78   Ht 1.727 m (5' 8\")   Wt 106.6 kg (235 lb)   SpO2 99%   BMI 35.73 kg/m    BMI= Body mass index is 35.73 kg/m .    EXAM:  General: Vital signs reviewed, in no apparent distress  Eyes: Anicteric  HENT: Normocephalic, atraumatic, trachea midline   Respiratory: Breathing nonlabored  Cardiovascular: Regular rate and rhythm  Breast: Approximately 3 cm tender, cystic skin lesion of the lateral aspect of the right inframammary fold without evidence of expressible drainage, no significant surrounding cellulitis  Musculoskeletal: No gross deformities  Neurologic: Grossly nonfocal exam  Psychiatric: Normal mood, affect and insight  Integumentary: Warm and dry    PROCEDURE:  The area overlying the patient's right inframammary lateral breast " lesion was prepped with ChloraPrep.  The area was then infiltrated with lidocaine local anesthetic.  The 11 blade scalpel was used to make a cruciate skin incision overlying the center of the lesion.  The underlying contents, consistent with sebaceous cyst material, were evacuated using gentle pressure.  The deep aspect of the wound cavity was infiltrated with local anesthetic.  Hemostasis was applied using gentle pressure.  The wound cavity was packed with quarter inch iodoform packing strip.  Sterile dressing was applied.  Patient tolerated the procedure well.    ASSESSMENT:  Dominique Arias is a 46 year old who presents with right breast subcutaneous cystic lesion.  Significant pertinent co-morbidities include: Obesity.       PLAN:  The patient's right breast subcutaneous cystic lesion was incised and drained today in clinic.  The patient tolerated this well.  She was provided with wound care supplies and instructed to perform daily wound packing changes until her wound completely heals.  Once the patient's right breast process has resolved and the underlying subcutaneous lesion has returned to its previous size, I have recommended definitive surgical excision to prevent future need for incision and drainage.  Patient is in agreement with this plan.  The procedure will be performed in the endoscopy procedure unit under local anesthetic.  The patient was instructed to contact my clinic directly when her right breast lesion is back to baseline and she is ready to proceed with definitive excision.  I have also encouraged the patient to resume annual screening mammograms.  She voices understanding.    It was my pleasure to participate in the care of Dominique Arias in clinic today. Thank you for this consultation.         Halina Sevilla MD    Please route or send letter to:  Primary Care Provider (PCP) and Referring Provider

## 2022-08-02 ENCOUNTER — OFFICE VISIT (OUTPATIENT)
Dept: VASCULAR SURGERY | Facility: CLINIC | Age: 47
End: 2022-08-02
Payer: COMMERCIAL

## 2022-08-02 DIAGNOSIS — I83.813 VARICOSE VEINS OF BILATERAL LOWER EXTREMITIES WITH PAIN: Primary | ICD-10-CM

## 2022-08-02 PROCEDURE — 99213 OFFICE O/P EST LOW 20 MIN: CPT | Performed by: SURGERY

## 2022-08-02 RX ORDER — THYROID,PORK 15 MG
15 TABLET ORAL DAILY
COMMUNITY
Start: 2022-07-05

## 2022-08-02 RX ORDER — THYROID,PORK 90 MG
TABLET ORAL
COMMUNITY
Start: 2022-07-05

## 2022-08-02 NOTE — PATIENT INSTRUCTIONS
Varicose Veins and Spider Veins    Varicose veins are swollen, enlarged veins most often found in the legs. They are usually blue or purple in color and may bulge, twist, and stand out under the skin. Spider veins are small veins just under your skin that can look red, blue or purple.        Normally, veins return blood from the body to the heart. The leg veins have one-way valves that prevent blood from flowing backward in the vein. When the valves are weak or damaged, blood backs up in the veins. This may cause some of the veins to swell and bulge and become varicose veins.     Symptoms  Varicose veins may or may not cause symptoms. If symptoms do occur, they can include:  Legs that feel tired, achy, heavy, or itchy  Leg swelling  Leg muscle cramps  Skin changes, such as discoloration, dryness, redness, or rash (in more severe cases, you may also have sores on the skin called venous leg ulcers)    Risk factors  There are a number of factors that increase the risk for varicose veins. These can include:   Being a woman  Being older  Sitting or standing for long periods  Being overweight  Being pregnant  Having a family history of varicose veins  Hormones, birth control pills    Treatment starts with simple self-help measures (see below). If these don't help, there are many procedures that can be done to shrink or remove varicose veins. Your healthcare provider can tell you more about these options, if needed.     Home care  Support or compression stockings will likely be prescribed. If so, be sure to wear them as directed. They may help improve blood flow.  Exercising helps strengthen your leg muscles and improve blood flow. To get the most benefit, choose exercises such as walking, swimming, or cycling. Also try to exercise for at least 30 minutes on most days.  Raising your legs above heart level will help relieve swelling and keep blood from pooling in veins. Try to elevate your legs for 15 to 20 minutes at  the end of the day, and whenever you're relaxing. To make sure your legs are raised above heart level, prop them up on cushions or large pillows.  To keep blood moving when you have to sit or stand for long periods, try these tips:  At work, take walking breaks instead of coffee breaks. Walk during your lunch hour. Or try flexing your feet up and down 10 times each hour.  When standing, raise yourself up and down on your toes, or rock back and forth on your heels.  If you are overweight, talk with your healthcare provider about setting up a weight-loss plan. Maintaining a healthy weight can help reduce the strain on your veins. It may also improve symptoms, such as swelling and aching.  If you have dryness and itching, ask your provider about special lotions that can be applied to the skin to help improve symptoms.     Follow-up care  Follow up with your healthcare provider, or as directed. If imaging tests were done, you'll be told the results and if there are any new findings that affect your care.     When to seek medical advice  Call your healthcare provider right away if any of these occur:  Sudden, severe leg swelling, pain, or redness  Symptoms worsen, or they don't improve with self-care  Bleeding from any affected veins  Ulcers form on the legs, ankles, or feet  Fever of 100.4 F (38 C) or higher, or as advised by your provider    [employer] last reviewed this educational content on 4/1/2018 2000-2021 [employer]. All rights reserved. This information is not intended as a substitute for professional medical care. Always follow your healthcare professional's instructions.    Self-Care for Spider and Varicose Veins    Your healthcare provider may suggest that you try self-care. Exercising and maintaining a healthy weight may keep problem veins from getting worse. Wearing elastic stockings and elevating your legs can help improve blood flow. Taking breaks when you sit or stand helps,  too.        Wearing compression stockings  Compression stockings gently squeeze veins so blood flows upward. If you need compression stockings, your healthcare provider can prescribe them for you. Follow your healthcare provider's advice about how and when to wear them. Compression stockings come in several different levels of pressure. Ask your healthcare provider which level of pressure would help you the most.     Raising your legs above heart level will help relieve swelling and keep blood from pooling in veins. Try to elevate your legs for 15 to 20 minutes at the end of the day, and whenever you're relaxing. To make sure your legs are raised above heart level, prop them up on cushions or large pillows.    To keep blood moving when you have to sit or stand for long periods, try these tips:  At work, take walking breaks instead of coffee breaks. Walk during your lunch hour. Or try flexing your feet up and down 10 times each hour.  When standing, raise yourself up and down on your toes, or rock back and forth on your heels.    [employer] last reviewed this educational content on 11/1/2019 2000-2021 [employer]. All rights reserved. This information is not intended as a substitute for professional medical care. Always follow your healthcare professional's instructions.    Treatment Options    Sclerotherapy  Your healthcare provider will inject the vein with a special chemical that will quickly close the vein from the inside. This is particularly useful for spider veins and smaller varicose veins.      If you have large varicose veins, surgery may be the best choice. But it will not prevent new varicose veins from forming. Surgery is most often done in a surgery center or in the office.    If surgery is recommended for you, your surgery will be tailored to your needs. Varicose veins may be tied off (ligation), destroyed, or removed. Blood will then flow through the healthy veins. One or more of the  following techniques may be used:    Ablation (laser or radiofrequency)  A tiny cut in the skin is made near the varicose vein. A small tube called a catheter is inserted into the vein. Energy or heat released from the catheter tip will make the vein walls collapse and stick together, stopping all blood flow through the vein.         Ablation (glue)  A tiny cut in the skin is made near the varicose vein. A small tube called a catheter is inserted into the vein. Droplets of glue are deposited into the vein to make vein walls collapse and stick together stopping blood flow through the vein.    Microphlebectomy or ambulatory phlebectomy  A special hook is used to gently take out a varicose vein through tiny incisions. Microphlebectomy may be done in your healthcare provider's office.      Vein stripping and ligation (rare)  In more severe cases, the surgeon may tie off and remove veins by making smaller cuts in the skin. Smaller branching veins may also be tied off or removed.    Know about the risks  Your healthcare provider will talk with you about the risks of surgery. These include:  Bleeding or swelling  A sense of numbness, burning, or tingling in areas near the procedure  Edema or swelling in the legs  Clots in the deep veins that may travel to the lungs  Infection  Scarring  Inflammation related to the glue    [employer] last reviewed this educational content on 11/1/2019 (Sclerotherapy image) 12/1/2019 (Radiofrequency ablation image, Microphlebectomy image)    6116-5008 [employer]. All rights reserved. This information is not intended as a substitute for professional medical care. Always follow your healthcare professional's instructions.

## 2022-08-02 NOTE — PROGRESS NOTES
[facility] [place] Progress Note    Dominique Arias presents in follow-up of bilateral lower extremity varicose veins with pain I saw her in 2020, just prior to the pandemic.  She had planned to return for lower extremity venous competency studies but failed to do so due to the pandemic.    She has pursued conservative management with compression, leg elevation, dietary measures and exercise since that time but has had her symptoms worsen despite best conservative measures and she returns today for discussion regarding definitive management.     Physical Exam  General: Pleasant female in no acute distress.   Extremities: Right lower extremity: 4 to 5 mm varicosities coursing from the proximal anterior right thigh, down the anterior thigh, lateral to the right knee and onto the lateral and posterior lateral right leg.  There are varicosities coursing from the mid popliteal crease down the posterior calf and coursing medially.  Scattered telangiectasias.    Left lower extremity: 4 to 6 mm varicosity over the distal medial left thigh and medial left calf.  Scattered telangiectasias.    Assessment:  Progressive right greater than left lower extremity varicose veins with pain.  She has pursued conservative measures over the last several years only to have them become larger and more symptomatic.    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  She has to take breaks from the classroom to do this.  The symptoms occur despite the use of compression hose.  She has pursued exercise and weight loss and is ready to no avail.    We briefly discussed the option of continued conservative measures with small risk of superficial thrombophlebitis, bleeding and progression of disease process.  If she wishes to have this  further evaluated, she will need bilateral lower extremity venous competency studies, the results of which could be discussed via a video visit.    Treatment options for superficial venous insufficiency utilizing endovenous ablation with oral sedation and local anesthesia were discussed.  We discussed thermal and nonthermal techniques treating the veins with benefits and risks of each.    Management of venous varicosities with either concomitant phlebectomy or delayed sclerotherapy if necessary were discussed.  At this time, she would lean toward treating underlying superficial insufficiency alone and treating the branch tributaries at a later date if necessary.      Finally, she asked if the appearance of the spider veins could be improved.  I discussed sclerotherapy of spider veins for cosmetic purposes but with risks of allergic reaction, deep vein thrombosis, superficial thrombophlebitis, ulceration and hyperpigmentation.  She also understands that multiple sessions may be necessary and that treatment will not be covered by insurance will be her responsibility.  She voiced understanding and her questions were answered.    Plan:  Bilateral lower extremity venous competency studies with video visit to discuss results    Tito Mena MD          VEIN CLINIC LEG DRAWING:

## 2022-08-02 NOTE — NURSING NOTE
Patient Reported symptoms:    Bilateral leg   Heaviness A good bit of the time   Achiness A little of the time   Swelling A little of the time   Throbbing None of the time   Itching Some of the time   Appearance Moderately noticeable   Impact on work/activities Symptoms but full able to participate

## 2022-08-02 NOTE — LETTER
8/2/2022         RE: Dominique Arias  [address] [place] [address]        Dear Colleague,    Thank you for referring your patient, Dominique Arias, to the [facility] [place]. Please see a copy of my visit note below.    [facility] [place] Progress Note    Dominique Arias presents in follow-up of bilateral lower extremity varicose veins with pain I saw her in 2020, just prior to the pandemic.  She had planned to return for lower extremity venous competency studies but failed to do so due to the pandemic.    She has pursued conservative management with compression, leg elevation, dietary measures and exercise since that time but has had her symptoms worsen despite best conservative measures and she returns today for discussion regarding definitive management.     Physical Exam  General: Pleasant female in no acute distress.   Extremities: Right lower extremity: 4 to 5 mm varicosities coursing from the proximal anterior right thigh, down the anterior thigh, lateral to the right knee and onto the lateral and posterior lateral right leg.  There are varicosities coursing from the mid popliteal crease down the posterior calf and coursing medially.  Scattered telangiectasias.    Left lower extremity: 4 to 6 mm varicosity over the distal medial left thigh and medial left calf.  Scattered telangiectasias.    Assessment:  Progressive right greater than left lower extremity varicose veins with pain.  She has pursued conservative measures over the last several years only to have them become larger and more symptomatic.    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  She has to take breaks from the classroom to do this.  The symptoms occur despite the use of compression hose.  She has  pursued exercise and weight loss and is ready to no avail.    We briefly discussed the option of continued conservative measures with small risk of superficial thrombophlebitis, bleeding and progression of disease process.  If she wishes to have this further evaluated, she will need bilateral lower extremity venous competency studies, the results of which could be discussed via a video visit.    Treatment options for superficial venous insufficiency utilizing endovenous ablation with oral sedation and local anesthesia were discussed.  We discussed thermal and nonthermal techniques treating the veins with benefits and risks of each.    Management of venous varicosities with either concomitant phlebectomy or delayed sclerotherapy if necessary were discussed.  At this time, she would lean toward treating underlying superficial insufficiency alone and treating the branch tributaries at a later date if necessary.      Finally, she asked if the appearance of the spider veins could be improved.  I discussed sclerotherapy of spider veins for cosmetic purposes but with risks of allergic reaction, deep vein thrombosis, superficial thrombophlebitis, ulceration and hyperpigmentation.  She also understands that multiple sessions may be necessary and that treatment will not be covered by insurance will be her responsibility.  She voiced understanding and her questions were answered.    Plan:  Bilateral lower extremity venous competency studies with video visit to discuss results    Tito Mena MD          VEIN CLINIC LEG DRAWING:                  Again, thank you for allowing me to participate in the care of your patient.        Sincerely,        Tito Mena MD

## 2022-08-17 ENCOUNTER — OFFICE VISIT (OUTPATIENT)
Dept: VASCULAR SURGERY | Facility: CLINIC | Age: 47
End: 2022-08-17
Payer: COMMERCIAL

## 2022-08-17 ENCOUNTER — ANCILLARY PROCEDURE (OUTPATIENT)
Dept: ULTRASOUND IMAGING | Facility: CLINIC | Age: 47
End: 2022-08-17
Attending: SURGERY
Payer: COMMERCIAL

## 2022-08-17 DIAGNOSIS — I83.813 VARICOSE VEINS OF BILATERAL LOWER EXTREMITIES WITH PAIN: ICD-10-CM

## 2022-08-17 DIAGNOSIS — I78.1 SPIDER VEINS: Primary | ICD-10-CM

## 2022-08-17 PROCEDURE — 93970 EXTREMITY STUDY: CPT | Performed by: SURGERY

## 2022-08-17 PROCEDURE — 99207 PR NO CHARGE NURSE ONLY: CPT

## 2022-08-17 PROCEDURE — A6533 GC STOCKING THIGHLNGTH 18-30: HCPCS

## 2022-08-17 NOTE — PROGRESS NOTES
Patient purchased 1 pair(s) of black, thigh high, closed-toe, size 4 compression hose from the clinic today.     Informed patient all compression hose purchases are final.    Michelle Thurston MA on 8/17/2022 at 1:24 PM

## 2022-08-18 ENCOUNTER — VIRTUAL VISIT (OUTPATIENT)
Dept: VASCULAR SURGERY | Facility: CLINIC | Age: 47
End: 2022-08-18
Payer: COMMERCIAL

## 2022-08-18 DIAGNOSIS — I83.813 VARICOSE VEINS OF BILATERAL LOWER EXTREMITIES WITH PAIN: Primary | ICD-10-CM

## 2022-08-18 PROCEDURE — 99213 OFFICE O/P EST LOW 20 MIN: CPT | Mod: GT | Performed by: SURGERY

## 2022-08-18 NOTE — PROGRESS NOTES
August 18, 2022    Vein Procedure Recommendation    Called and left detailed voice message.    Dr. Mena has recommended patient to have the following vein procedure(s):     1. Right leg VNUS closure GSV and ASV and Sclerotherapy (cosmetic)    2. Bilateral leg Ultrasound Guided Sclerotherapy (medically necessary) (at 6 week post op if necessary)    3. Possibly Left leg VNUS closure of ASV, (+/-) GSV    Patient Pre-op Questions:  Preferred Pharmacy: [facility]  in [place] on [address].  Anticoagulant/ASA: No  Artificial Joint or Heart Valve: No  Open ulcer: No  Sedation nausea: No    Patient is recommended to wear Thigh High compression hose following her procedure. Discussed compression hose. Explained to patient if insurance doesn't cover the compression hose there are a couple different options to getting them and to call the clinic to let us know if they need help.    written procedure instructions to review on their own (see After Visit Summary) via [employer].       Next steps:    Insurance Submission  Informed patient this process could take up to 14 business days, but once approved, the patient will be contacted by our surgery scheduler to schedule the above procedure. Gave patient our surgery scheduler's information.    Call back number provided and encouraged patient to call with any questions.     Carla Faria RN  [facility]  Vein Clinic

## 2022-08-18 NOTE — PATIENT INSTRUCTIONS
Pre-Procedure Instructions:                                        VNUS Closure  You are having a VNUS Closure. One or more of your veins will be closed with radiofrequency heating.    Insurance  Precertification and/or referral authorization may be required by your insurance company.  We will call your insurance company to verify benefits for the medically necessary part of your procedure.    Your Current Medications and Allergies  Are you on blood thinner medications? (Aspirin, Plavix, Coumadin, Eliquis, Xarelto) Please discuss this with your surgeon.  Are you sensitive to latex or adhesives used for fake fingernails? Please let us know!     Driving Escort and   Please arrange to have a trusted adult (18 years old or older) drive you to and from the clinic.  For your safety, we recommend you have a trusted adult to stay with you until the next morning.    Your Health  If you have a change in your health before the procedure, contact our office immediately.  (For example: cold symptoms, cough, urinary tract infection, fever, flu symptoms).  A pre-procedure physical is not required.    Note  It is sometimes necessary to adjust the procedure schedule due to emergencies. We greatly appreciate your flexibility and understanding in this matter.  Compression hose are needed following this procedure.  __________________________________________________________________________________    Check List:  The Morning of Your Procedure  ___1.  Please do not apply anything on your leg(s) or shave the day of your procedure.  ___2.  You may take your normal medications the day of your procedure.  ___3.  It is recommended you eat a light breakfast or lunch on the day of your procedure.  ___4.  Wear comfortable loose-fitting clothing and wide-fitting shoes (i.e. tennis shoes, slip-ons).  ___5.  Please arrive at our clinic at the specified time given by the nurse.  ___6.  You will sign an affirmation of informed  consent.  ___7.  Bring your pre-procedure sedation medication (lorazepam and clonidine) with you to the clinic. One hour              before your procedure, you will be instructed to take these medications. The lorazepam (Ativan) lowers              anxiety and sedates you; the clonidine makes the lorazepam more effective. Everyone's body processes              these medications differently. Therefore, reactions to these medications vary. Some people stay awake and              some people sleep through the whole procedure. You may not remember everything about the procedure              or the day. You do not want to make any big decisions for the rest of the day.  ___8.  Bring the appropriate compression hose (i.e.thigh high).           The Day of Your Procedure:                  VNUS Closure  In the Exam Room  A nurse will bring you back to an exam room with your family member or friend. This is when your informed consent will be signed, and you will take your pre-procedure medications.  You will be asked to remove everything from the waist down, including undergarments. You will then put on a hospital gown or shorts and blue booties.  Your surgeon will come in to answer any questions and leandra the appropriate leg(s) with a marker.  You will be taken to the restroom to empty your bladder before going into the procedure room.    In the Procedure Room  You will be escorted to the procedure room. You will lie on a procedure table covered with a sheet or blanket.  A nurse will put a blood pressure cuff on your arm and a pulse/oxygen monitor on a finger. Your vital signs will be monitored every 15 minutes.  Your gown will be pulled up slightly and the groin exposed for a short period of time. The surgeon's assistant will clean your foot, leg, and groin with an antibacterial solution. We will get you covered up as quickly as possible!  Sterile towels and blue drapes will be used to cover you and the table. You will be  asked to keep your hands under the blue drapes during the procedure.    The Procedure  The surgeon will visualize your veins with an ultrasound machine. He or she will then numb your skin and access the vein. A catheter is passed up the vein and positioned with ultrasound guidance. The table will then be tipped head down.  Once the catheter is in the correct position, medication will be injected to numb your leg. You will feel some needle sticks and may feel discomfort as the medication goes in. Once this is done, you should not experience significant discomfort. But if you do, please let us know and more numbing medication can be injected. As the catheter sends out heat, the vein closes off and the catheter is withdrawn.        Post-Procedure  Once the procedure is done, your leg will be washed with warm water and dried. You will have one or more small bandages covering your incisions. Your compression hose will be put on or an ACE wrap from your toes to groin. If the ACE wrap feels too tight or binds, please elevate your leg and loosen it.  You will be offered something to drink and a light snack.  You will rest with your leg(s) elevated for approximately 30 minutes. Your friend or family member may join you.   For your safety, you will be accompanied to your car by a staff member.    Post-Procedure Instructions:                          VNUS Closure    Post-Op Day Zero - The Day of Your Procedure:  1. Medication for Pain Control and Inflammation Control   - The numbing medication injected during your procedure will last for several hours. The pre-procedure    tablets may make you very sleepy and you might not remember everything from the procedure or from    the day. This will usually wear off by the next day.   - Ibuprofen:  If tolerated, take ibuprofen (e.g., Advil) to reduce inflammation whether or not you have    pain. For three days, take two tablets (200mg each) with every meal and at bedtime with a snack.  If    you are not able to take Ibuprofen, Tylenol is another option.   - You may resume taking any medications you were taking before your procedure.  2. Activity   - You may be up as tolerated when the sedation wears off. Elevate if possible when not walking.  3. Bandages   - You will have one or more small bandages covering the incision site(s) where we accessed the vein(s).    Keep your bandages on and dry for 48 hours. Compression hose should be worn continuously over    the bandages for the first 24 hours.  4. Incisions   - Bleeding: You may see some incision sites that are oozing through the bandages. This is not unusual    and can be managed with Rest, Ice, Compression and Elevation (RICE). Apply ice and firm pressure    directly to the site that is bleeding and rest with your leg(s) elevated above your heart for 20-30 minutes.    Post-Op Day One:  1. Medication   - Ibuprofen:  Continue the same as the Day of Your Procedure.  2. Activity   - Walk as tolerated. Resume your normal daily walking activities. If it hurts, stop. We encourage you to               walk. Elevate if possible when not walking.  3. Bandages and Compression   - After 24 hours, you may remove your compression hose to take a shower. Please keep your bandage(s)    on and intact. You may want to cover your bandage(s) to ensure it remains dry during your shower.    Reapply your compression hose after your shower and wear during waking hours only.  4. Driving   - You may resume driving when you can do so safely.    Post-Op Day Two:   1. Medication  - Ibuprofen:  Continue the same as the Day of Your Procedure.  2.  Activity   - Walk as tolerated. Elevate if possible when not walking.  3. Bandages and Compression  - You may remove your bandage after 48 hours. Continue wearing your compression hose during waking hours only for a total of seven days following your procedure.  4. Incisions   - Your leg(s) may be bruised at or near incision site(s)  and possibly have numb spots. This is normal.   5. Call Us If:   - You see any areas on your leg that are red and angry in appearance.   - You notice any drainage that is milky or cloudy in appearance or that has a foul odor.   - You run a temperature of 100.5 or greater.      Post-Op Day Three:  You will have a follow up appointment 2-4 days post-procedure. At this appointment, you will have an ultrasound and we will check your incisions.      The Two Weeks Following Your Procedure  1.  Skin Care              - Do not use any lotions, creams, or powders on your incisions for 14 days or until the incisions have healed.              - Do not soak in a bathtub, hot tub or go swimming for 14 days or until your incisions have healed.  2.  Medications   - You may use ibuprofen or acetaminophen (e.g., Tylenol) as needed for pain or discomfort.  3.  Activity   - Do not lift over 25 pounds. After about two weeks you may resume exercise such as aerobics, running,    tennis or weightlifting. Use your common sense and ease back into your exercise routine slowly.   - You may feel a cord-like tightness along the inside of your leg. Gentle stretching can be helpful.  4. Compression Hose   - Your doctor may instruct you to wear compression for longer than seven days; please    follow your doctor's instructions. As a comfort measure, you may choose to wear compression for    longer than required.  5.  Travel   - Do not fly in an airplane for 14 days after your procedure.  If you have a long car trip planned within    two to three weeks following your procedure, stop and walk for a few minutes every two hours.    Periodic ankle pumps during the ride may be helpful.    Six Week Appointment  - At your six-week appointment, you will see your surgeon for an exam and evaluation. This office visit               will be scheduled when you return for post-op day three return appointment.     Return to Work  1.  If you work outside the home,  you may return to work the next day depending on the extent of your    procedure, how you tolerate it, and the type of work you perform.  2.  Paperwork: If your employer requires paperwork or you would like a letter written to your employer, please    let us know. We will complete disability type forms at no charge. Please allow five business days for    forms to be completed.     [employer] last reviewed this educational content on 11/1/2019 2000-2021 [employer]. All rights reserved. This information is not intended as a substitute for professional medical care. Always follow your healthcare professional's instructions.     Sclerotherapy: Pre-Treatment Instructions    Recommended Sessions:  2-4 treatment sessions    Pricing: Full session - $407                 *Payment is due at the time of visit following the treatment    Time Required per Treatment Session - About 45 minutes  Please come in 15 minutes before your scheduled appointment.  30 min.  Sclerotherapy treatments last approximately 30 minutes.  5 min.    A staff member will wipe your legs off with warm water and dry them with a wash cloth.                 Then you can put your compression hose on, get dressed and check out.  10 min.  After your treatment, you will be asked to walk around for 10 minutes before you get in your car.    Medications  Five days before your appointment, discontinue aspirin (Bufferin, Anacin, etc.) and Ibuprofen (Motrin, Advil, Aleve, etc.) to reduce bruising. Resume these medications the day following the treatment.    Leg Preparation  Do not shave your legs or apply any oil, lotion or powder the night before or the day of your treatment.    Clothing  Shorts:  Bring a pair of loose, comfortable shorts to wear during your treatment (or you can choose to wear ours). Shoes: Bring comfortable shoes to accommodate the compression hose after your treatment. Do not wear flip-flops or thong-style sandals unless you have  open-toe compression hose.    Photographs  Photos will be taken before each treatment. This helps monitor your progress.    Injections  The physician will inject your veins with the sclerotherapy solution chosen to meet your specific needs.    Compression Hose  Please bring your compression hose if you have them. They may also be reserved for you at our clinic. Compression hose must be worn immediately after each sclerotherapy treatment.  The hose must be compression level 20-30, and they must be worn for 24 hours straight after your treatment.  If you have never worn compression hose before, a staff member will teach you how to put them on.             You cannot have a treatment without compression hose.               They are critical to the success of your treatment!    You may purchase your compression hose from us. We will measure you and have the hose available when you come for your treatment.    Cancellation and Rescheduling  If you need to cancel or reschedule your sclerotherapy treatment, please give our office at least 24 hour notice.    Sclerotherapy: Basic Information        What is sclerotherapy?  Sclerotherapy is a treatment for  spider  veins.  Spider  veins are small veins just under your skin that can look red, blue or purple. Most  spider  veins are only a cosmetic problem.  Spider  veins are not useful and treating them will not affect your circulation.    How does sclerotherapy work?  1.  Injections: A very small needle is used to inject a solution into the  spider  veins. The solution irritates the cells that line the vein walls. This causes the veins to collapse. The vein walls to stick together and they can no longer carry blood. Different solutions are used based on the size of the veins.  2.  Compression:  The spider veins are kept collapsed by wearing compression stockings. Your body will break down and absorb the treated veins. You wear the compression hose for 24 hours after the  treatment and then for 4 more days during your waking hours only.    How does the body heal after sclerotherapy?  The process is similar to how your body heals after a bad bruise. It takes 4-6 weeks or more for the healing to be complete. When the healing is complete, the vein is no longer visible. It may take more than one treatment.    How do I get the best results?  It is important to follow the post-sclerotherapy instructions. The best results require time and patience. The injection sites will continue to heal and fade for months after a treatment. Please discuss your expectations with your doctor to keep them realistic. Your doctor will do everything possible to meet or exceed your expectations.    How many treatments are needed?  After your initial exam, your doctor will give you an estimate of the number of treatments that may be required. It depends upon the size, type, and quantity of your  spider  veins and on the doctor's assessment, your history and expectations. You may end up needing fewer or more treatments.    How soon can I have another treatment?  Additional treatments are scheduled every 4-6 weeks to allow time for the body to respond to the previous treatment.    Common Side Effects:  Itching  The areas that were injected may itch. This is usually mild and lasts less than a day. Do not use lotions or creams on your legs until the injection sites have healed over.    Pain  It is common to have some tenderness at the injection sites. Injection of the solution can be uncomfortable, but is usually well tolerated by most patients. The tenderness is temporary, lasting 24 hours at most. Tylenol or Ibuprofen can be used, if needed, following the product directions.    Bruising  This may occur at the injections sites. Bruising may be minimized by avoiding Aspirin and Ibuprofen products for five days before each treatment session.    Hyperpigmentation  A light brown discoloration of the skin may develop  along the veins in the areas injected. Approximately 20-30% of patients treated note the discoloration (which is lighter and less obvious than the veins that are being treated). The hyperpigmentation usually fades in a couple of weeks, but may take several months to a year to totally resolve. There is a 1% chance of hyperpigmentation continuing after one year.    Trapped blood  A small amount of blood may become trapped and hardened in the veins. This may feel like a knot or cord and it may look dark blue or bruised. This is a common occurrence. You may need to return before your next treatment so this area can be drained to remove the trapped blood. This will reduce the hyperpigmentation that can occur. The chance of this occurring can be decreased with proper use of compression hose after your treatment.    Matting  Matting is the formation of new, fine  spider  veins in the area injected.  It occurs in approximately 10% of patients injected. The exact reason for this is unknown. If untreated, the matting usually resolves in 3 to 12 months, but very rarely, it can be permanent. If the matting does not fade, it can be re-injected.    Rare Side Effects:  Ulceration at injection sites  Very rarely, a small ulcer will occur at the site where a vein is injected. An ulcer can take 4 to 6 weeks to completely heal. A small scar may result.    Allergic reactions  There is a very rare incidence of an allergic reaction to the solution injected. You will be observed for such reaction and will be treated appropriately should it occur. Please inform us of any allergy history.    Pulmonary embolus/Deep vein thrombosis  This is a blood clot which moves to the lungs/a blood clot in the deep vein system. There is an extraordinarily low incidence of this complication.      SCLEROTHERAPY AFTER CARE  Immediately:  After treatment, walk for 10-15 minutes before getting in your car.  If your trip home is more than 1 hour, stop and walk  around for 5-10 minutes. Avoid sitting or standing for extended periods.   First 24 hours: Wear your compression continuously, even while in bed. After the 24 hours, you may shower if you want to. Put your hose back on, unless you are going to bed. You should NOT wear compression to bed after the first 24 hours. You may fly the next day, but wear your compression.   For 5 days: Wear the compression hose for waking hours only. You may continue to wear them longer than 5 days if you prefer.   For days 5-7: Walking is encouraged, as it promotes efficient circulation in your veins. You may do activities that raise your heart rate, but do NOT run, jog, do high impact aerobics, or weight lifting. After 7 days, no activity restrictions.  Shaving: Wait a few days to shave or apply lotion.   Bathing: Do NOT take hot baths or sit in a hot tub for 7-10 days.    For 1 year: Wear SPF 30 sunscreen on your legs when in the sun. This is very important! It helps prevent darkening of the skin at the injection sites.   Medications: You may resume your usual medications, including aspirin or ibuprofen.    Common Things to Expect       Compression must be worn for the first 24 hours and then during the day for 5-7 days.    If larger veins are treated with ultrasound-guided sclerotherapy, you will have redness, firmness, tenderness, and swelling.  This firmness and tenderness may take 3-6 months to resolve. Ibuprofen and compression hose will aid in this process.    You will have bruising that can last up to 3 weeks. Most fading of the veins will occur between 3 and 6 weeks after treatment.    You may notice brown discoloration (hyperpigmentation) at the treatment site.  This should fade with time, but will take 3 months to 1 year to fully heal.     Some treated veins may look darker because of trapped blood within the vein. This trapped blood can be removed at a minimum of 1 month following treatment. Larger veins are more likely to  develop trapped blood.    It is very important for you to use at least SPF 30 sunscreen in order to help prevent the discoloration of your skin.    Migraines rarely occur following sclerotherapy, but are more likely in patients with a history of migraines.  Treat as you would any other migraine.      [employer] last reviewed this educational content on 11/1/2019 2000-2021 [employer]. All rights reserved. This information is not intended as a substitute for professional medical care. Always follow your healthcare professional's instructions.

## 2022-08-18 NOTE — LETTER
8/18/2022         RE: Dominique Arias  [address]  [place] [address]        Dear Colleague,    Thank you for referring your patient, Dominique Arias, to the [facility]IN CLINIC [place]. Please see a copy of my visit note below.    Dominique is a 47 year old who is being evaluated via a billable video visit.      How would you like to obtain your AVS? MyChart  If the video visit is dropped, the invitation should be resent by: Text to cell phone: [phone]  Will anyone else be joining your video visit? No        Video-Visit Details    Video Start Time: 9:05 AM    Type of service:  Video Visit    Video End Time: 9:23 AM    Originating Location (pt. Location): Home    Distant Location (provider location):  [facility] VEIN [facility]     Platform used for Video Visit: [employer]    [facility] [place] Progress Note    Dominique Arias presents in follow-up of bilateral lower extremity varicose veins with pain.  Please see my office dictations of 8-22 and 3/10/2020 for details.  Conservative measures for more than 2 years including exercise, leg elevation, graduated, prescription compression hose and weight management have not controlled the symptoms adequately.  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      She returned on 8/17/2022 for bilateral lower extremity venous competency studies, the results of which we will discuss on today's video visit.    Physical Exam  General: Pleasant female in no acute distress.  Blood pressure 132/87, pulse 74  Extremities: Right lower extremity: 4 to 5 mm varicosities coursing from the proximal anterior right thigh, down the anterior thigh, lateral to the right knee and onto the lateral and posterior lateral right leg.  There are varicosities coursing from the mid  popliteal crease down the posterior calf and coursing medially.  Scattered telangiectasias.     Left lower extremity: 4 to 6 mm varicosity over the distal medial left thigh and medial left calf.  Scattered telangiectasias.    Ultrasound:  INDICATION:  Varicose veins with pain.      EXAM TYPE  BILATERAL LOWER EXTREMITY VENOUS DUPLEX FOR VENOUS INSUFFICIENCY  TECHNICAL SUMMARY     A duplex ultrasound study using color flow was performed, to evaluate the bilateral lower extremity veins for valvular incompetence with the patient in a steep reversed trendelenberg.      RIGHT:     The deep veins demonstrate phasic flow, compress and respond to augmentations.  There is no reflux or DVT.  The common femoral vein is incompetent and free of thrombus. The remaining deep veins are competent and free of thrombus.      The GSV demonstrates phasic flow, compresses and responds to augmentations from the saphenofemoral junction to the ankle with no evidence of  thrombus. The great saphenous vein measures 8.9 mm at the saphenofemoral junction, 6.9 mm at the proximal thigh, and 4.6 mm at the knee. The GSV is incompetent from Proximal Thigh to distal thigh and again from the proximal calf to the mid calf, with the greatest reflux time of 6368 milliseconds.   The GSV gives rise to multiple incompetent varicose veins, the largest measures 7.0 mm off the Proximal Calf that courses Medial with a reflux time of 3761 milliseconds.      The AASV is incompetent ( 8.9 mm) draining into the saphenofemoral junction. The AASV is incompetent from the saphenofemoal junction to the mid thigh with a reflux time of 1864 milliseconds. The AASV takes a straight course for 22 cm. The AASV gives rise to a varicose branch measuring 9.5 mm off the Proximal Thigh that courses Anterolateral with a reflux time of 1881 milliseconds.      The Giacomini vein is competent ( 2.1 mm) communicating with the small saphenous vein at the knee level.      The SSV  demonstrates phasic flow, compresses and responds to augmentations from the popliteal space to the ankle.  No reflux or thrombus is seen.  The saphenopopliteal junction is absent.      Perforators: there is no evidence of incompetent  veins at any level.      LEFT:     The deep veins demonstrate phasic flow, compress and respond to augmentations.  There is no reflux or DVT.  The common femoral, proximal femoral, and mid femoral veins are incompetent and free of thrombus. The remaining deep veins are competent and free of thrombus.      The GSV demonstrates phasic flow, compresses and responds to augmentations from the saphenofemoral junction to the ankle with no evidence of  thrombus. The great saphenous vein measures 7.2 mm at the saphenofemoral junction, 4.8 mm at the proximal thigh and  4.1 mm at the knee. The GSV is incompetent from Proximal Calf to Mid Calf, with the greatest reflux time of 3233 milliseconds.    The GSV gives rise to multiple incompetent varicose veins, the largest measures 7.0 mm off the Proximal Thigh that courses Posteromedial with a reflux time of 5988 milliseconds.         The AASV is incompetent ( 6.0 mm) draining into the saphenofemoral junction. The AASV is incompetent at the saphenofemoal junction with a reflux time of 1254 milliseconds. The AASV takes a straight course for 22 cm.      The Giacomini vein is competent ( 2.4 mm) communicating with the small saphenous vein at the knee level.      The SSV demonstrates phasic flow, compresses and responds to augmentations from the popliteal space to the ankle.  No thrombus is seen. The saphenopopliteal junction is competent ( 4.2 mm). The SSV is incompetent at the Mid Calf with a reflux time of 1402 milliseconds.       Perforators: there is no evidence of incompetent  veins at any level.      Varicose vein is noted that is a tributary of the SFJ measuring 4.3 mm with 4504 milliseconds of reflux.         FINAL  SUMMARY:     1.  No evidence of deep vein thrombosis or superficial thrombophlebitis in either lower extremity  2.  Right common femoral vein incompetence  3.  Left common femoral, proximal femoral and mid femoral vein incompetence  4.  Right great saphenous vein incompetence  5.  Right anterior accessory saphenous vein incompetence  6.  Right anterior accessory saphenous vein incompetent vein branch, 9.5 mm diameter, reflux time 1.8 seconds  7.  Right great saphenous vein incompetent vein branch, 7 mm in diameter, reflux time 3.7 seconds  8.  Left great saphenous vein incompetence  9.  Limited left small saphenous vein incompetence  10.  Left anterior accessory saphenous vein incompetence  11.  7 mm incompetent vein branch, reflux time 5.9 seconds, coursing from left thigh great saphenous vein  12.  4.3 mm incompetent vein branch, reflux time 4.5 seconds, coursing from the left anterior accessory saphenous vein     The time of incompetence is greater than 500 milliseconds in superficial and  veins and greater than 1000 milliseconds in deep veins.     AUSTEN [place] MD, FACS    Assessment:  Failure of conservative management of bilateral lower extremity chronic venous insufficiency.  Ultrasound reveals incompetence of her right great saphenous and anterior chest or saphenous veins with the great saphenous vein measuring 6.9 mm diameter in the proximal thigh with reflux time of 4.8 seconds in the anterior sensory saphenous vein measuring 6.4 mm in diameter in the proximal thigh with reflux time of 1.8 seconds.  The great saphenous vein is a source of a 7 mm diameter incompetent vein branch with reflux time of 3.7 seconds body age accessory saphenous vein is the source of a 9.5 mm diameter incompetent vein branch with reflux time of 1.8 seconds.    Her left anterior accessory saphenous vein measures 6 mm in diameter with reflux time of 1.2 seconds as a source of a 4.3 mm diameter incompetent vein branch  with reflux time of 4.5 seconds while the right great saphenous vein is incompetent, measures 4.2 mm in diameter with reflux time of 3.2 seconds.  Is the source of an incompetent vein branch measuring 7 mm in diameter of the proximal thigh great saphenous vein with reflux time of 5.9 seconds.    As she has failed conservative management and because the symptoms are interfering with her activities of daily living, she is a candidate for endovenous ablation of the right great saphenous vein and the anterior accessory saphenous vein, the more symptomatic leg.  If she finds good symptomatic relief on the right lower extremity, we may proceed with treatment of her left lower extremity at a later date.    Details of procedure including risks of bleeding, infection, nerve injury, scarring, hyperpigmentation, deep vein thrombosis, recanalization of the great saphenous vein and recurrent varicose veins were discussed.  The patient voiced understanding of the procedure and her questions were answered.    Plan:  Radiofrequency ablation of the right great saphenous vein, and accessory saphenous vein.  This will be followed with ultrasound-guided sclerotherapy of symptomatic right lower extremity great saphenous and anterior sensory saphenous vein tributaries if her symptoms not relieved.  This will be performed at the 6-week postprocedure visit.    Her left lower extremity may need to be treated at some point in the future which would entail treatment of the great saphenous vein into accessory saphenous veins as well.    She did ask about treatment of spider telangiectasias for cosmetic purposes with clear understanding that this would not be covered insurance would be her responsibility.    Tito Mena MD      Dictated using Dragon voice recognition software which may result in transcription errors      .  ..      August 18, 2022    Vein Procedure Recommendation    Called and left detailed voice message.      Trina has recommended patient to have the following vein procedure(s):     1. Right leg VNUS closure GSV and ASV and Sclerotherapy (cosmetic)    2. Bilateral leg Ultrasound Guided Sclerotherapy (medically necessary) (at 6 week post op if necessary)    3. Possibly Left leg VNUS closure of ASV, (+/-) GSV    Patient Pre-op Questions:  Preferred Pharmacy: [facility]  in [place] on [address].  Anticoagulant/ASA: No  Artificial Joint or Heart Valve: No  Open ulcer: No  Sedation nausea: No    Patient is recommended to wear Thigh High compression hose following her procedure. Discussed compression hose. Explained to patient if insurance doesn't cover the compression hose there are a couple different options to getting them and to call the clinic to let us know if they need help.    written procedure instructions to review on their own (see After Visit Summary) via [employer].       Next steps:    Insurance Submission  Informed patient this process could take up to 14 business days, but once approved, the patient will be contacted by our surgery scheduler to schedule the above procedure. Gave patient our surgery scheduler's information.    Call back number provided and encouraged patient to call with any questions.     Carla Faria RN  [facility]  Vein Clinic        Again, thank you for allowing me to participate in the care of your patient.        Sincerely,        Tito Mena MD

## 2022-08-18 NOTE — PROGRESS NOTES
Dominique is a 47 year old who is being evaluated via a billable video visit.      How would you like to obtain your AVS? MyChart  If the video visit is dropped, the invitation should be resent by: Text to cell phone: [phone]  Will anyone else be joining your video visit? No        Video-Visit Details    Video Start Time: 9:05 AM    Type of service:  Video Visit    Video End Time: 9:23 AM    Originating Location (pt. Location): Home    Distant Location (provider location):  [facility] VEIN CLINIC [place]     Platform used for Video Visit: [employer] [facility] Vein Clinic [place] Progress Note    Dominique Arias presents in follow-up of bilateral lower extremity varicose veins with pain.  Please see my office dictations of 8-22 and 3/10/2020 for details.  Conservative measures for more than 2 years including exercise, leg elevation, graduated, prescription compression hose and weight management have not controlled the symptoms adequately.  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      She returned on 8/17/2022 for bilateral lower extremity venous competency studies, the results of which we will discuss on today's video visit.    Physical Exam  General: Pleasant female in no acute distress.  Blood pressure 132/87, pulse 74  Extremities: Right lower extremity: 4 to 5 mm varicosities coursing from the proximal anterior right thigh, down the anterior thigh, lateral to the right knee and onto the lateral and posterior lateral right leg.  There are varicosities coursing from the mid popliteal crease down the posterior calf and coursing medially.  Scattered telangiectasias.     Left lower extremity: 4 to 6 mm varicosity over the distal medial left thigh and medial left calf.  Scattered  telangiectasias.    Ultrasound:  INDICATION:  Varicose veins with pain.      EXAM TYPE  BILATERAL LOWER EXTREMITY VENOUS DUPLEX FOR VENOUS INSUFFICIENCY  TECHNICAL SUMMARY     A duplex ultrasound study using color flow was performed, to evaluate the bilateral lower extremity veins for valvular incompetence with the patient in a steep reversed trendelenberg.      RIGHT:     The deep veins demonstrate phasic flow, compress and respond to augmentations.  There is no reflux or DVT.  The common femoral vein is incompetent and free of thrombus. The remaining deep veins are competent and free of thrombus.      The GSV demonstrates phasic flow, compresses and responds to augmentations from the saphenofemoral junction to the ankle with no evidence of  thrombus. The great saphenous vein measures 8.9 mm at the saphenofemoral junction, 6.9 mm at the proximal thigh, and 4.6 mm at the knee. The GSV is incompetent from Proximal Thigh to distal thigh and again from the proximal calf to the mid calf, with the greatest reflux time of 6368 milliseconds.   The GSV gives rise to multiple incompetent varicose veins, the largest measures 7.0 mm off the Proximal Calf that courses Medial with a reflux time of 3761 milliseconds.      The AASV is incompetent ( 8.9 mm) draining into the saphenofemoral junction. The AASV is incompetent from the saphenofemoal junction to the mid thigh with a reflux time of 1864 milliseconds. The AASV takes a straight course for 22 cm. The AASV gives rise to a varicose branch measuring 9.5 mm off the Proximal Thigh that courses Anterolateral with a reflux time of 1881 milliseconds.      The Giacomini vein is competent ( 2.1 mm) communicating with the small saphenous vein at the knee level.      The SSV demonstrates phasic flow, compresses and responds to augmentations from the popliteal space to the ankle.  No reflux or thrombus is seen.  The saphenopopliteal junction is absent.      Perforators: there is no  evidence of incompetent  veins at any level.      LEFT:     The deep veins demonstrate phasic flow, compress and respond to augmentations.  There is no reflux or DVT.  The common femoral, proximal femoral, and mid femoral veins are incompetent and free of thrombus. The remaining deep veins are competent and free of thrombus.      The GSV demonstrates phasic flow, compresses and responds to augmentations from the saphenofemoral junction to the ankle with no evidence of  thrombus. The great saphenous vein measures 7.2 mm at the saphenofemoral junction, 4.8 mm at the proximal thigh and  4.1 mm at the knee. The GSV is incompetent from Proximal Calf to Mid Calf, with the greatest reflux time of 3233 milliseconds.    The GSV gives rise to multiple incompetent varicose veins, the largest measures 7.0 mm off the Proximal Thigh that courses Posteromedial with a reflux time of 5988 milliseconds.         The AASV is incompetent ( 6.0 mm) draining into the saphenofemoral junction. The AASV is incompetent at the saphenofemoal junction with a reflux time of 1254 milliseconds. The AASV takes a straight course for 22 cm.      The Giacomini vein is competent ( 2.4 mm) communicating with the small saphenous vein at the knee level.      The SSV demonstrates phasic flow, compresses and responds to augmentations from the popliteal space to the ankle.  No thrombus is seen. The saphenopopliteal junction is competent ( 4.2 mm). The SSV is incompetent at the Mid Calf with a reflux time of 1402 milliseconds.       Perforators: there is no evidence of incompetent  veins at any level.      Varicose vein is noted that is a tributary of the SFJ measuring 4.3 mm with 4504 milliseconds of reflux.         FINAL SUMMARY:     1.  No evidence of deep vein thrombosis or superficial thrombophlebitis in either lower extremity  2.  Right common femoral vein incompetence  3.  Left common femoral, proximal femoral and mid femoral vein  incompetence  4.  Right great saphenous vein incompetence  5.  Right anterior accessory saphenous vein incompetence  6.  Right anterior accessory saphenous vein incompetent vein branch, 9.5 mm diameter, reflux time 1.8 seconds  7.  Right great saphenous vein incompetent vein branch, 7 mm in diameter, reflux time 3.7 seconds  8.  Left great saphenous vein incompetence  9.  Limited left small saphenous vein incompetence  10.  Left anterior accessory saphenous vein incompetence  11.  7 mm incompetent vein branch, reflux time 5.9 seconds, coursing from left thigh great saphenous vein  12.  4.3 mm incompetent vein branch, reflux time 4.5 seconds, coursing from the left anterior accessory saphenous vein     The time of incompetence is greater than 500 milliseconds in superficial and  veins and greater than 1000 milliseconds in deep veins.     AUSTEN [place] MD, FACS    Assessment:  Failure of conservative management of bilateral lower extremity chronic venous insufficiency.  Ultrasound reveals incompetence of her right great saphenous and anterior accessory saphenous veins with the great saphenous vein measuring 6.9 mm diameter in the proximal thigh with reflux time of 4.8 seconds in the anterior sensory saphenous vein measuring 6.4 mm in diameter in the proximal thigh with reflux time of 1.8 seconds.  The great saphenous vein is a source of a 7 mm diameter incompetent vein branch with reflux time of 3.7 seconds body age accessory saphenous vein is the source of a 9.5 mm diameter incompetent vein branch with reflux time of 1.8 seconds.    Her left anterior accessory saphenous vein measures 6 mm in diameter with reflux time of 1.2 seconds as a source of a 4.3 mm diameter incompetent vein branch with reflux time of 4.5 seconds while the right great saphenous vein is incompetent, measures 4.2 mm in diameter with reflux time of 3.2 seconds.  Is the source of an incompetent vein branch measuring 7 mm in diameter  of the proximal thigh great saphenous vein with reflux time of 5.9 seconds.    As she has failed conservative management and because the symptoms are interfering with her activities of daily living, she is a candidate for endovenous ablation of the right great saphenous vein and the anterior accessory saphenous vein, the more symptomatic leg.  If she finds good symptomatic relief on the right lower extremity, we may proceed with treatment of her left lower extremity at a later date.    Details of procedure including risks of bleeding, infection, nerve injury, scarring, hyperpigmentation, deep vein thrombosis, recanalization of the great saphenous vein and recurrent varicose veins were discussed.  The patient voiced understanding of the procedure and her questions were answered.    Plan:  Radiofrequency ablation of the right great saphenous vein, and accessory saphenous vein.  This will be followed with ultrasound-guided sclerotherapy of symptomatic right lower extremity great saphenous and anterior sensory saphenous vein tributaries if her symptoms not relieved.  This will be performed at the 6-week postprocedure visit.    Her left lower extremity may need to be treated at some point in the future which would entail treatment of the great saphenous vein into accessory saphenous veins as well.    She did ask about treatment of spider telangiectasias for cosmetic purposes with clear understanding that this would not be covered insurance would be her responsibility.    Tito Mena MD      Dictated using Dragon voice recognition software which may result in transcription errors      .  ..

## 2022-09-26 ENCOUNTER — TELEPHONE (OUTPATIENT)
Dept: VASCULAR SURGERY | Facility: CLINIC | Age: 47
End: 2022-09-26

## 2022-09-29 NOTE — TELEPHONE ENCOUNTER
Called and LM to schedule.    Carla Foster, Surgery Scheduler  [facility] Vein [facility]

## 2022-10-20 DIAGNOSIS — I83.813 VARICOSE VEINS OF BILATERAL LOWER EXTREMITIES WITH PAIN: Primary | ICD-10-CM

## 2022-12-08 ENCOUNTER — TELEPHONE (OUTPATIENT)
Dept: VASCULAR SURGERY | Facility: CLINIC | Age: 47
End: 2022-12-08

## 2022-12-08 DIAGNOSIS — I83.813 VARICOSE VEINS OF BILATERAL LOWER EXTREMITIES WITH PAIN: Primary | ICD-10-CM

## 2022-12-08 NOTE — TELEPHONE ENCOUNTER
12/8/2022    Vein Clinic Preoperative Nurse Call    Procedure: Right leg VNUS closure GSV, ASV(med nec), sclero ($), Left leg VNUS closure GSV, ASV(med nec)  Date: 12/15/2022  Surgeon: Dr. Mena  Time: 0830  Check in time: 0730    Called patient and left a detailed message. Informed patient: when to check in (0730) to sign consent, to bring their preop medications in their original bottle with them (3mg ativan, 0.1mg clonidine). Patient will take the medications after signing the consent to the procedure. Instructed patient to wear a mask, wear loose-fitting comfortable clothing, and bring their compression hose. Ensured patient has a /someone that will be responsible for them the rest of the day. Visitors are allowed in the clinic, but they would need to stay in an exam room or wait in the parking lot or leave. If  does leave, IF POSSIBLE, we ask that they do not go more than 15-20 mins from our clinic. Once procedure is completed, we will keep patient in recovery for 30-45 mins, and call  with aftercare instructions. Informed patient, that if possible, they should sit in the backseat to elevate their leg on the ride home.    Pt needs Thigh High compression hose for procedure. Status of the hose: patient has thigh high per chart.    Special instructions: bring compression hose to procedure to be donned post procedure      Special COVID-19 instructions: Patient aware they need to wear a mask to our clinic and during their procedure. Patient aware their  can come in with them, but would need to stay in an exam room during the procedure or wait in the parking lot or leave. Nurse will call patient's  when procedure is over.    Patient understands if they have any of the following symptoms (fever, cough, shortness of breath, rash), they need to notify us immediately to cancel their procedure and will have to reschedule for a later date.    Gave patient our call back number if any  further questions or concerns.    Kaushik Maurice RN  [facility] Vein [facility]

## 2022-12-09 ENCOUNTER — TELEPHONE (OUTPATIENT)
Dept: VASCULAR SURGERY | Facility: CLINIC | Age: 47
End: 2022-12-09

## 2022-12-09 RX ORDER — CLONIDINE HYDROCHLORIDE 0.1 MG/1
TABLET ORAL
Qty: 1 TABLET | Refills: 0 | Status: SHIPPED | OUTPATIENT
Start: 2022-12-09 | End: 2022-12-15

## 2022-12-09 RX ORDER — LORAZEPAM 1 MG/1
TABLET ORAL
Qty: 3 TABLET | Refills: 0 | Status: SHIPPED | OUTPATIENT
Start: 2022-12-09 | End: 2022-12-15

## 2022-12-09 NOTE — TELEPHONE ENCOUNTER
Pt has a Right leg VNUS closure GSV, ASV(med nec), sclero ($), Left leg VNUS closure GSV, ASV(med nec) on 12/15/22. She is choosing to go without the oral sedation and wanted to make the nurses aware.    Thank you.

## 2022-12-09 NOTE — TELEPHONE ENCOUNTER
Called and LVM for patient letting her know I got the message for no sedation. Also, to call back to clarify that she was planning on having procedure done on both legs, the same day. Call back number provided in message.

## 2022-12-13 ENCOUNTER — TELEPHONE (OUTPATIENT)
Dept: VASCULAR SURGERY | Facility: CLINIC | Age: 47
End: 2022-12-13

## 2022-12-13 NOTE — PATIENT INSTRUCTIONS
Post-Procedure Instructions:                          VNUS Closure    Post-Op Day Zero - The Day of Your Procedure:  1. Medication for Pain Control and Inflammation Control   - The numbing medication injected during your procedure will last for several hours. The pre-procedure    tablets may make you very sleepy and you might not remember everything from the procedure or from    the day. This will usually wear off by the next day.   - Ibuprofen:  If tolerated, take ibuprofen (e.g., Advil) to reduce inflammation whether or not you have    pain. For three days, take two tablets (200mg each) with every meal and at bedtime with a snack. If    you are not able to take Ibuprofen, Tylenol is another option.   - You may resume taking any medications you were taking before your procedure.  2. Activity   You may be up as tolerated, when the sedation wears off. Elevate if possible when not walking.  3. Bandages   - You will have one or more small bandages covering the incision site(s) where we accessed the vein(s).    Keep your bandages on and dry for 48 hours. Compression hose should be worn continuously over    the bandages for the first 24 hours.  4. Incisions   - Bleeding: You may see some incision sites that are oozing through the bandages. This is not unusual    and can be managed with Rest, Ice, Compression and Elevation (RICE). Apply ice and firm pressure    directly to the site that is bleeding and rest with your leg(s) elevated above your heart for 20-30 minutes.    Post-Op Day One:  1. Medication   - Ibuprofen:  Continue the same as the Day of Your Procedure.  2. Activity   - Walk as tolerated. Resume your normal daily walking activities. If it hurts, stop. We encourage you to               walk. Elevate if possible when not walking.  3. Bandages and Compression   - After 24 hours, you may remove your compression hose to take a shower. Please keep your bandage(s)    on and intact. You may want to cover your  bandage(s) to ensure it remains dry during your shower.    Reapply your compression hose after your shower and wear during waking hours only.  4. Driving   - You may resume driving when you can do so safely.    Post-Op Day Two:   1. Medication  - Ibuprofen:  Continue the same as the Day of Your Procedure.  2.  Activity   - Walk as tolerated. Elevate if possible when not walking.  3. Bandages and Compression  - You may remove your bandage after 48 hours. Continue wearing your compression hose during waking hours only for a total of seven days following your procedure.  4. Incisions   - Your leg(s) may be bruised at and around the incision site(s). This is normal.  5. Call Us If:   - You see any areas on your leg that are red and angry in appearance.   - You notice any drainage that is milky or cloudy in appearance or that has a foul odor.   - You run a temperature of 100.5 or greater.    Post-Op Day Three:  You will have a follow up appointment 2-4 days post-procedure. At this appointment, you will have an ultrasound and we will check your incisions.    ___________________________________________________________________________________________    The Two Weeks Following Your Procedure  1.  Skin Care   - Do not use any lotions, creams or powders on your leg for 14 days or until the incisions have healed.   - Do not soak in a bathtub, whirlpool, or hot tub or go swimming for 14 days or until your incisions have  healed.  2.  Medications   - You may use ibuprofen or acetaminophen (e.g., Tylenol) as needed for pain or discomfort.  3.  Activity   - Do not lift over 25 pounds. After about two weeks you may resume exercise such as aerobics, running,    tennis or weight lifting. Use your common sense and ease back into your exercise routine slowly.   - You may feel a cord-like tightness along the inside of your leg. Gentle stretching can be helpful.  4. Compression Hose   - Your doctor may instruct you to wear compression  for longer than seven days; please    follow your doctor's instructions. As a comfort measure, you may choose to wear compression for    longer than required.  5.  Travel   - Do not fly in an airplane for 14 days after your procedure.  If you have a long car trip planned within    two to three weeks following your procedure, stop and walk for a few minutes every two hours.    Periodic ankle pumps during the ride may be helpful.    Six Week Appointment   At your six week appointment, you will see your surgeon for an exam and evaluation. This office visit    will be scheduled when you return for Post-op Day Three Return Appointment.     Return to Work  1.  If you work outside the home, you may return to work in a few days depending on the extent of your    procedure, how you tolerate it, and the type of work you perform.  2.  Paperwork: If your employer requires paperwork or you would like a letter written to your employer, please    let us know. We will complete disability type forms at no charge. Please allow five business days for    forms to be completed.    SCLEROTHERAPY AFTER CARE  Immediately:  After treatment, walk for 10-15 minutes before getting in your car.  If your trip home is more than 1 hour, stop and walk around for 5-10 minutes. Avoid sitting or standing for extended periods.   First 24 hours: Wear your compression continuously, even while in bed. After the 24 hours, you may shower if you want to. Put your hose back on, unless you are going to bed. You should NOT wear compression to bed after the first 24 hours. You may fly the next day, but wear your compression.   For 5 days: Wear the compression hose for waking hours only. You may continue to wear them longer than 5 days if you prefer.   For days 5-7: Walking is encouraged, as it promotes efficient circulation in your veins. You may do activities that raise your heart rate, but do NOT run, jog, do high impact aerobics, or weight lifting. After 7  days, no activity restrictions.    Shaving: Wait a few days to shave or apply lotion.   Bathing: Do NOT take hot baths or sit in a hot tub for 7-10 days.    For 1 year: Wear SPF 30 sunscreen on your legs when in the sun. This is very important! It helps prevent darkening of the skin at the injection sites.   Medications: You may resume your usual medications, including aspirin or ibuprofen.    Common Things to Expect  -  Compression must be worn for the first 24 hours and then during the day for 5-7 days.    -  If larger veins are treated with ultrasound-guided sclerotherapy, you will have redness, firmness, tenderness, and swelling.  This firmness and tenderness may take 3-6 months to resolve. Ibuprofen and compression hose will aid in this process.    -  You will have bruising that can last up to 3 weeks. Most fading of the veins will occur between 3 and 6 weeks after treatment.    -  You may notice brown discoloration (hyperpigmentation) at the treatment site.  This should fade with time, but will take 3 months to 1 year to fully heal.     -  Some treated veins may look darker because of trapped blood within the vein. This trapped blood can be removed at a minimum of 1 month following treatment. Larger veins are more likely to develop trapped blood.    -  It is very important for you to use at least SPF 30 sunscreen in order to help prevent the discoloration of your skin.    -  Migraines rarely occur following sclerotherapy, but are more likely in patients with a history of migraines.  Treat as you would any other migraine.

## 2022-12-13 NOTE — TELEPHONE ENCOUNTER
Pt has Right leg VNUS closure GSV, ASV(med nec), sclero ($), Left leg VNUS closure GSV, ASV(med nec) on 12/15/22.     She is confirming that she received the message about her hose and that she is going without the sedation.     Thanks

## 2022-12-15 ENCOUNTER — TRANSFERRED RECORDS (OUTPATIENT)
Dept: HEALTH INFORMATION MANAGEMENT | Facility: CLINIC | Age: 47
End: 2022-12-15

## 2022-12-15 ENCOUNTER — OFFICE VISIT (OUTPATIENT)
Dept: VASCULAR SURGERY | Facility: CLINIC | Age: 47
End: 2022-12-15
Payer: COMMERCIAL

## 2022-12-15 VITALS — OXYGEN SATURATION: 97 % | SYSTOLIC BLOOD PRESSURE: 136 MMHG | DIASTOLIC BLOOD PRESSURE: 80 MMHG | HEART RATE: 78 BPM

## 2022-12-15 DIAGNOSIS — I83.813 VARICOSE VEINS OF BOTH LOWER EXTREMITIES WITH PAIN: ICD-10-CM

## 2022-12-15 DIAGNOSIS — I78.1 SPIDER TELANGIECTASIS OF SKIN: ICD-10-CM

## 2022-12-15 DIAGNOSIS — I78.1 SPIDER VEINS: Primary | ICD-10-CM

## 2022-12-15 PROCEDURE — 36476 ENDOVENOUS RF VEIN ADD-ON: CPT | Mod: 50 | Performed by: SURGERY

## 2022-12-15 PROCEDURE — 36475 ENDOVENOUS RF 1ST VEIN: CPT | Mod: 50 | Performed by: SURGERY

## 2022-12-15 PROCEDURE — 36468 NJX SCLRSNT SPIDER VEINS: CPT | Performed by: SURGERY

## 2022-12-15 PROCEDURE — S9999 SALES TAX: HCPCS | Performed by: SURGERY

## 2022-12-15 NOTE — LETTER
12/15/2022         RE: Dominique Arias  [address] [place] [address]        Dear Colleague,    Thank you for referring your patient, Dominique Arias, to the [facility] [place]. Please see a copy of my visit note below.        Vein Clinic Procedure Note    Indications:  Bilateral lower extremity varicose veins with pain; symptoms recalcitrant to conservative measures    Procedure:  1. Radiofrequency ablation right great saphenous vein  2. Radiofrequency ablation right anterior accessory saphenous vein  3. Radiofrequency ablation left great saphenous vein  4. Radiofrequency ablation left anterior accessory saphenous vein  5. Bilateral lower extremity cosmetic sclerotherapy    Procedure Description  Details of the procedure including risks of bleeding, infection, nerve injury, scarring, hyperpigmentation, deep vein thrombosis, recanalization of the great saphenous or anterior sensory saphenous veins and recurrent varicose veins all discussed.  The patient voiced understanding and wished to proceed.  Informed consent was obtained.   As we were treating the great saphenous and anterior accessory saphenous veins bilaterally, the legs were not initialed because the same procedures were being performed bilaterally.      Cosmetic sclerotherapy  I had the patient lie supine and prone on our table and, after donning the [place] headlight, I injected spider telangiectasias and a few reticular veins over the thighs and legs bilaterally, circumferentially.  Most notable clusters were on the posterior lateral calves bilaterally.  Used a total of 3 syringes (9 mL) of 0.5% polidocanol foam.  The patient tolerated this well.    We then proceeded the operating room, had the patient lie supine on the operating table, then prepped and draped her bilateral lower extremities sterilely.    We took a timeout to confirm the appropriate operative site and procedure: Radiofrequency ablation of the bilateral  great saphenous and anterior accessory saphenous veins with cosmetic sclerotherapy (cosmetic sclerotherapy already performed)    VNUS Closure  I imaged the right lower extremity easily identifying the right great saphenous vein.  I infiltrated the skin overlying the vein just cephalad to the mid calf, distal to the last major varicosity, placed a micropuncture needle into the vein followed by a micropuncture guidewire and a 7 [place] sheath.    I then imaged the right anterior thigh identifying the right anterior accessory saphenous vein.  I accessed the vein in the distal third of the anteromedial thigh, leaving a micropuncture guidewire in place.    Right great saphenous vein ablation  We passed the closure fast device through the sheath, positioning of the tip of the device 2.19 centimeters from the saphenofemoral junction under ultrasound guidance with the operating table in Trendelenburg position.  Tumescent anesthetic was injected along the course of the great saphenous vein under ultrasound guidance with care to confirm catheter tip position prior to infiltrating the tissues in this location.    After allowing the block to take effect and after confirming appropriate position of the catheter, I applied compression to the proximal thigh great saphenous vein with the ultrasound probe and additional width 2 fingerbreadths treating the first 3- 7 cm increments with 2 RF sessions each.  The remaining vein was treated with 1 RF session to each 7cm increment with the exception of segments of vein where energy readings were higher.  We treated down to just cephalad of the mid calf.  The patient was comfortable throughout.    Right anterior accessory saphenous vein ablation  I passed a 7 Malaysian sheath over the guidewire into the right anterior sensory saphenous vein followed by passage of the closure fast device, positioning the tip of the device 2.05 cm from the saphenofemoral junction under ultrasound guidance.   Tumescent anesthetic was injected along the anterior accessory saphenous vein under ultrasound guidance with care to confirm catheter tip position prior to infiltrating the tissues around junction.    Allow the block to take effect, confirmed catheter to position once again with the operative table in Trendelenburg position, then applied compression to the proximal thigh anterior accessory saphenous vein treating the first 2- 7 cm increments with 2 RF sessions each while the remaining vein was treated with 1 RF session to each 7 cm increment.    After cleaning the pullback, I reimaged the vein and noted that the vein was noncompressible and closed.  The saphenofemoral junction and common femoral veins were fully compressible and free of thrombus.  This was documented.  The sheath and the catheter were removed and hemostasis secured with pressure.    Left great saphenous vein ablation  Left great saphenous vein was identified on the left lower extremity with ultrasound and the vein accessed approximately 8 cm below the knee.  The vein was somewhat superficial at the level of the knee and the distal third of the thigh before diving down into the saphenous tunnel.  The great saphenous vein had a branch which coursed posteriorly just distal to the mid thigh.  We position the tip of the device 2.0 cm from the saphenofemoral junction under ultrasound guidance and then injected tumescent anesthetic along the course of the vein without any table in Trendelenburg position.  We treated the left great saphenous vein in exactly the same fashion as described for the right great saphenous vein.  After completing the pullback and after confirming that the vein was closed, I removed the sheath and the catheter again hemostasis with pressure.    Left anterior accessory saphenous vein ablation  I imaged the left anterior thigh and easily identified left anterior accessory saphenous vein, choosing to access the vein just distal to the  mid thigh where it broke through the fascia.  I infiltrated the skin overlying the vein with 1% lidocaine with bicarbonate, placed a micropuncture into the vein followed by a micropuncture guidewire and a 7 [place] sheath.  The closure vasculitis was passed and the tip of the device positioned 2.39 cm from the saphenofemoral junction under ultrasound guidance.  Tumescent anesthetic was injected along the course of the vein under ultrasound guidance with care to confirm catheter tip position near the saphenofemoral junction with the operative table in Trendelenburg position.  We allow the block side effect, confirmed catheter tip position and then treated the left anterior accessory saphenous vein with 2 RF sessions to the first two 7 cm increments and 1 RF session to the remaining segments of the vein.  After completing the pullback I confirmed that the saphenofemoral junction and common femoral veins were fully compressible and free of thrombus.  The sheath and catheter were removed and hemostasis secured with pressure.    The leg was cleaned with saline solution and a small gauze and Tegaderm dressing applied to the access site.  A compression hose was then applied.  We observed the patient for 30 minutes to ensure excellent hemostasis then took her to her 's car in a wheelchair.  Post procedure instructions were given to the patient and her  in verbal and written form and their questions answered.    The patient tolerated the procedure well without evidence of allergic reaction or other complications and will return in 72 hours for a bilateral lower extremity venous ultrasound.    [place] Closure    Date/Time: 12/15/2022 10:50 AM  Performed by: Tito Mena MD  Authorized by: Tito Mena MD     Time out: Immediately prior to the procedure a time out was called    Preparation: Patient was prepped and draped in usual sterile fashion    1st Assist: Estrella Cardoza,  CST/CSFA    Circulator: Kaushik Maurice RN    Procedure:  VNUS  Procedure side:  Right  Vein Treated:  GSV  Patient tolerance:  Patient tolerated the procedure well with no immediate complications  [place] Closure    Date/Time: 12/15/2022 10:50 AM  Performed by: Tito Mena MD  Authorized by: Tito Mena MD     Procedure:  VNUS  Procedure side:  Right  Second and Subsequent Vein    Vein Treated:  ASV  Patient tolerance:  Patient tolerated the procedure well with no immediate complications  [place] Closure    Date/Time: 12/15/2022 10:51 AM  Performed by: Tito Mena MD  Authorized by: Tito Mena MD     Procedure:  VNUS  Procedure side:  Left  Vein Treated:  GSV  Patient tolerance:  Patient tolerated the procedure well with no immediate complications  [place] Closure    Date/Time: 12/15/2022 10:51 AM  Performed by: Tito Mena MD  Authorized by: Tito Mena MD     Procedure:  VNUS  Procedure side:  Left  Second and Subsequent Vein    Vein Treated:  ASV  Patient tolerance:  Patient tolerated the procedure well with no immediate complications  Wrap/Hose:  Hose  Sclerotherapy    Date/Time: 12/15/2022 11:14 AM  Performed by: Tito Mena MD  Authorized by: Tito Mena MD     Time out: Immediately prior to the procedure a time out was called    Type:  Cosmetic  Session:  Full  Solution/Amount:  .5 POLIDOCANOL  Syringes:  3 syringes (9 mL 0.5% polidocanol foam  Patient tolerance:  Patient tolerated the procedure well with no immediate complications  Wrap/Hose:  Hose        Flowsheet Data 12/15/2022   Procedure Start Time:  9:07 AM   Prep: Chloraprep   Side: Bilateral   Tx Length (cm): RIGHT ASV:30.5   Junction (cm): RIGHT ASV:2.05   RF Cycles: RIGHT ASV:7   RF TX Time (Minutes): RIGHT ASV: :20   How many additional vein treatments are being performed? 3   Tx Length (cm) - 2: RIGHT GSV: 59   Junction 2 (cm): RIGHT  GSV: 2.19   RF Cycles 2 : RIGHT GSV:11   RF TX Time (Minutes) 2: RIGHT GSV:3:40   Tx Length (cm)- 3: LEFT ASV: 30.5   Junction 3 (cm): LEFT ASV: 2.39   RF Cycles 3: LEFT ASV: 7   RF TX Time (Minutes) 3: LEFT ASV:2:20   Tx Length (cm)- 4: LEFT GSV: 53   Junction 4 (cm): LEFT GSV: 2.09   RF Cycles 4: LEFT GSV:10   RF TX Time (Minutes) 4: LEFT GSV: 3:20   Sedation taken: No   Pre Pt. Physical / Cognitive Limitations: WNL   TOTAL Local anesthesia Injected (ml): 15   Max Volume Local Anesthesia (ml): 22   TOTAL Tumescent Injected volume (ml): 475   Max Volume Tumescent (ml): 572   Post Pt. Physical / Cognitive Limitations: WNL   Procedure End Time: 10:39 AM   D/C Instructions given, states readiness to leave and escorted to car: Yes       AUSTEN Mena MD    Dictated using Dragon voice recognition software which may result in transcription errors    Pre-procedure Nursing Note    Dominique Arias presents to clinic for Vein Procedure  .   /Person Responsible for Patient: Yon (Spouse)  Phone Number: [phone]    Prophylactic Medication:N/A   Sedation Medication: N/A (Did not take sedation meds.)  Compression Stockings: Patient brought with today.  The procedure is being performed on BLE.  Patient understanding of procedure matches consent? YES  .    Kaushik Maurice RN on 12/15/2022 at 7:26 AM      Again, thank you for allowing me to participate in the care of your patient.        Sincerely,        Tito Mena MD

## 2022-12-15 NOTE — PROGRESS NOTES
Vein Clinic Procedure Note    Indications:  Bilateral lower extremity varicose veins with pain; symptoms recalcitrant to conservative measures    Procedure:  1. Radiofrequency ablation right great saphenous vein  2. Radiofrequency ablation right anterior accessory saphenous vein  3. Radiofrequency ablation left great saphenous vein  4. Radiofrequency ablation left anterior accessory saphenous vein  5. Bilateral lower extremity cosmetic sclerotherapy    Procedure Description  Details of the procedure including risks of bleeding, infection, nerve injury, scarring, hyperpigmentation, deep vein thrombosis, recanalization of the great saphenous or anterior sensory saphenous veins and recurrent varicose veins all discussed.  The patient voiced understanding and wished to proceed.  Informed consent was obtained.   As we were treating the great saphenous and anterior accessory saphenous veins bilaterally, the legs were not initialed because the same procedures were being performed bilaterally.      Cosmetic sclerotherapy  I had the patient lie supine and prone on our table and, after donning the [place] headlight, I injected spider telangiectasias and a few reticular veins over the thighs and legs bilaterally, circumferentially.  Most notable clusters were on the posterior lateral calves bilaterally.  Used a total of 3 syringes (9 mL) of 0.5% polidocanol foam.  The patient tolerated this well.    We then proceeded the operating room, had the patient lie supine on the operating table, then prepped and draped her bilateral lower extremities sterilely.    We took a timeout to confirm the appropriate operative site and procedure: Radiofrequency ablation of the bilateral great saphenous and anterior accessory saphenous veins with cosmetic sclerotherapy (cosmetic sclerotherapy already performed)    VNUS Closure  I imaged the right lower extremity easily identifying the right great saphenous vein.  I infiltrated the skin  overlying the vein just cephalad to the mid calf, distal to the last major varicosity, placed a micropuncture needle into the vein followed by a micropuncture guidewire and a 7 Yi sheath.    I then imaged the right anterior thigh identifying the right anterior accessory saphenous vein.  I accessed the vein in the distal third of the anteromedial thigh, leaving a micropuncture guidewire in place.    Right great saphenous vein ablation  We passed the closure fast device through the sheath, positioning of the tip of the device 2.19 centimeters from the saphenofemoral junction under ultrasound guidance with the operating table in Trendelenburg position.  Tumescent anesthetic was injected along the course of the great saphenous vein under ultrasound guidance with care to confirm catheter tip position prior to infiltrating the tissues in this location.    After allowing the block to take effect and after confirming appropriate position of the catheter, I applied compression to the proximal thigh great saphenous vein with the ultrasound probe and additional width 2 fingerbreadths treating the first 3- 7 cm increments with 2 RF sessions each.  The remaining vein was treated with 1 RF session to each 7cm increment with the exception of segments of vein where energy readings were higher.  We treated down to just cephalad of the mid calf.  The patient was comfortable throughout.    Right anterior accessory saphenous vein ablation  I passed a 7 Yi sheath over the guidewire into the right anterior sensory saphenous vein followed by passage of the closure fast device, positioning the tip of the device 2.05 cm from the saphenofemoral junction under ultrasound guidance.  Tumescent anesthetic was injected along the anterior accessory saphenous vein under ultrasound guidance with care to confirm catheter tip position prior to infiltrating the tissues around junction.    Allow the block to take effect, confirmed catheter to  position once again with the operative table in Trendelenburg position, then applied compression to the proximal thigh anterior accessory saphenous vein treating the first 2- 7 cm increments with 2 RF sessions each while the remaining vein was treated with 1 RF session to each 7 cm increment.    After cleaning the pullback, I reimaged the vein and noted that the vein was noncompressible and closed.  The saphenofemoral junction and common femoral veins were fully compressible and free of thrombus.  This was documented.  The sheath and the catheter were removed and hemostasis secured with pressure.    Left great saphenous vein ablation  Left great saphenous vein was identified on the left lower extremity with ultrasound and the vein accessed approximately 8 cm below the knee.  The vein was somewhat superficial at the level of the knee and the distal third of the thigh before diving down into the saphenous tunnel.  The great saphenous vein had a branch which coursed posteriorly just distal to the mid thigh.  We position the tip of the device 2.0 cm from the saphenofemoral junction under ultrasound guidance and then injected tumescent anesthetic along the course of the vein without any table in Trendelenburg position.  We treated the left great saphenous vein in exactly the same fashion as described for the right great saphenous vein.  After completing the pullback and after confirming that the vein was closed, I removed the sheath and the catheter again hemostasis with pressure.    Left anterior accessory saphenous vein ablation  I imaged the left anterior thigh and easily identified left anterior accessory saphenous vein, choosing to access the vein just distal to the mid thigh where it broke through the fascia.  I infiltrated the skin overlying the vein with 1% lidocaine with bicarbonate, placed a micropuncture into the vein followed by a micropuncture guidewire and a 7 Spanish sheath.  The closure vasculitis was  passed and the tip of the device positioned 2.39 cm from the saphenofemoral junction under ultrasound guidance.  Tumescent anesthetic was injected along the course of the vein under ultrasound guidance with care to confirm catheter tip position near the saphenofemoral junction with the operative table in Trendelenburg position.  We allow the block side effect, confirmed catheter tip position and then treated the left anterior accessory saphenous vein with 2 RF sessions to the first two 7 cm increments and 1 RF session to the remaining segments of the vein.  After completing the pullback I confirmed that the saphenofemoral junction and common femoral veins were fully compressible and free of thrombus.  The sheath and catheter were removed and hemostasis secured with pressure.    The leg was cleaned with saline solution and a small gauze and Tegaderm dressing applied to the access site.  A compression hose was then applied.  We observed the patient for 30 minutes to ensure excellent hemostasis then took her to her 's car in a wheelchair.  Post procedure instructions were given to the patient and her  in verbal and written form and their questions answered.    The patient tolerated the procedure well without evidence of allergic reaction or other complications and will return in 72 hours for a bilateral lower extremity venous ultrasound.    [place] Closure    Date/Time: 12/15/2022 10:50 AM  Performed by: Tito Mena MD  Authorized by: Tito Mena MD     Time out: Immediately prior to the procedure a time out was called    Preparation: Patient was prepped and draped in usual sterile fashion    1st Assist: Estrella Cardoza CST/CSFA    Circulator: Kaushik Maurice RN    Procedure:  VNUS  Procedure side:  Right  Vein Treated:  GSV  Patient tolerance:  Patient tolerated the procedure well with no immediate complications  [place] Closure    Date/Time: 12/15/2022 10:50 AM  Performed by:  Tito Mena MD  Authorized by: Tito Mena MD     Procedure:  VNUS  Procedure side:  Right  Second and Subsequent Vein    Vein Treated:  ASV  Patient tolerance:  Patient tolerated the procedure well with no immediate complications  [place] Closure    Date/Time: 12/15/2022 10:51 AM  Performed by: Tito Mena MD  Authorized by: Tito Mena MD     Procedure:  VNUS  Procedure side:  Left  Vein Treated:  GSV  Patient tolerance:  Patient tolerated the procedure well with no immediate complications  [place] Closure    Date/Time: 12/15/2022 10:51 AM  Performed by: Tito Mena MD  Authorized by: Tito Mena MD     Procedure:  VNUS  Procedure side:  Left  Second and Subsequent Vein    Vein Treated:  ASV  Patient tolerance:  Patient tolerated the procedure well with no immediate complications  Wrap/Hose:  Hose  Sclerotherapy    Date/Time: 12/15/2022 11:14 AM  Performed by: Tito Mena MD  Authorized by: Tito Mena MD     Time out: Immediately prior to the procedure a time out was called    Type:  Cosmetic  Session:  Full  Solution/Amount:  .5 POLIDOCANOL  Syringes:  3 syringes (9 mL 0.5% polidocanol foam  Patient tolerance:  Patient tolerated the procedure well with no immediate complications  Wrap/Hose:  Hose        Flowsheet Data 12/15/2022   Procedure Start Time:  9:07 AM   Prep: Chloraprep   Side: Bilateral   Tx Length (cm): RIGHT ASV:30.5   Junction (cm): RIGHT ASV:2.05   RF Cycles: RIGHT ASV:7   RF TX Time (Minutes): RIGHT ASV: :20   How many additional vein treatments are being performed? 3   Tx Length (cm) - 2: RIGHT GSV: 59   Junction 2 (cm): RIGHT GSV: 2.19   RF Cycles 2 : RIGHT GSV:11   RF TX Time (Minutes) 2: RIGHT GSV:3:40   Tx Length (cm)- 3: LEFT ASV: 30.5   Junction 3 (cm): LEFT ASV: 2.39   RF Cycles 3: LEFT ASV: 7   RF TX Time (Minutes) 3: LEFT ASV:2:20   Tx Length (cm)- 4: LEFT GSV: 53    Junction 4 (cm): LEFT GSV: 2.09   RF Cycles 4: LEFT GSV:10   RF TX Time (Minutes) 4: LEFT GSV: 3:20   Sedation taken: No   Pre Pt. Physical / Cognitive Limitations: WNL   TOTAL Local anesthesia Injected (ml): 15   Max Volume Local Anesthesia (ml): 22   TOTAL Tumescent Injected volume (ml): 475   Max Volume Tumescent (ml): 572   Post Pt. Physical / Cognitive Limitations: WNL   Procedure End Time: 10:39 AM   D/C Instructions given, states readiness to leave and escorted to car: Yes       AUSTEN Mena MD    Dictated using Dragon voice recognition software which may result in transcription errors    Pre-procedure Nursing Note    Dominique Arias presents to clinic for Vein Procedure  .   /Person Responsible for Patient: Yon (Spouse)  Phone Number: [phone]    Prophylactic Medication:N/A   Sedation Medication: N/A (Did not take sedation meds.)  Compression Stockings: Patient brought with today.  The procedure is being performed on BLE.  Patient understanding of procedure matches consent? YES  .    Kaushik Maurice RN on 12/15/2022 at 7:26 AM

## 2022-12-20 ENCOUNTER — ANCILLARY PROCEDURE (OUTPATIENT)
Dept: ULTRASOUND IMAGING | Facility: CLINIC | Age: 47
End: 2022-12-20
Attending: SURGERY
Payer: COMMERCIAL

## 2022-12-20 ENCOUNTER — OFFICE VISIT (OUTPATIENT)
Dept: VASCULAR SURGERY | Facility: CLINIC | Age: 47
End: 2022-12-20
Attending: SURGERY
Payer: COMMERCIAL

## 2022-12-20 DIAGNOSIS — I83.813 VARICOSE VEINS OF BILATERAL LOWER EXTREMITIES WITH PAIN: ICD-10-CM

## 2022-12-20 DIAGNOSIS — Z09 POSTOP CHECK: Primary | ICD-10-CM

## 2022-12-20 PROCEDURE — 99207 PR NO CHARGE NURSE ONLY: CPT

## 2022-12-20 PROCEDURE — 93970 EXTREMITY STUDY: CPT | Performed by: SURGERY

## 2022-12-20 NOTE — PATIENT INSTRUCTIONS
Vein Closure (Ablation)  Common Things to Expect    A small lump may develop at the site(s) where the vein closure device was inserted. This is a normal step in healing. These should not be painful, but may be tender to the touch. It can take 6 weeks to 3 months for the lumps/firmness to resolve.   Bruising will look worse before it looks better and can last for 4-6 weeks.  After about 10 days, you may notice tightness/pulling on the inside thigh and knee. As your ablated vein is healing, it contracts, causing a tightness or pulling sensation. This may last for several weeks, but will resolve. Treat it with Ibuprofen or Advil.  Numbness will get better with time, but may take 3 months to a year to resolve.  You may notice that the skin on your legs has become ultra-sensitive to touch. For example, the weight of your sheets may feel painful. This usually resolves in 6 weeks.  Ankle swelling is not uncommon and may last 4-6 weeks.   To get optimal results from your procedure, wearing your compression hose is key for the first 7 days. This is necessary to ensure proper closure of the ablated vein.    For 2 weeks, no weight lifting over 25lbs, no running, and no vigorous aerobic exercise. After this time, ease back into your normal activities. If you do too much too soon, you will have more pain and bruising and possibly re-open the vein that was closed. It takes about 2 weeks for the ablated vein to permanently close. Keep in mind your body is still healing.  For 2 weeks, do not shave your legs or use lotions, powders, creams to allow proper healing of vein access sites.      If you are experiencing any of the following symptoms, please seek immediate medical attention at your local emergency department.  - Significant pain in the back of the calf possibly with difficulty walking  - Significant swelling and/or tenderness in the back of the calf  - Redness that continues to spread  - Chest pain and/or shortness of  breath       SCLEROTHERAPY AFTER CARE  Immediately:  After treatment, walk for 10-15 minutes before getting in your car.  If your trip home is more than 1 hour, stop and walk around for 5-10 minutes. Avoid sitting or standing for extended periods.   First 24 hours: Wear your compression continuously, even while in bed. After the 24 hours, you may shower if you want to. Put your hose back on, unless you are going to bed. You should NOT wear compression to bed after the first 24 hours. You may fly the next day, but wear your compression.   For 5 days: Wear the compression hose for waking hours only. You may continue to wear them longer than 5 days if you prefer.   For days 5-7: Walking is encouraged, as it promotes efficient circulation in your veins. You may do activities that raise your heart rate, but do NOT run, jog, do high impact aerobics, or weight lifting. After 7 days, no activity restrictions.    Shaving: Wait a few days to shave or apply lotion.   Bathing: Do NOT take hot baths or sit in a hot tub for 7-10 days.    For 1 year: Wear SPF 30 sunscreen on your legs when in the sun. This is very important! It helps prevent darkening of the skin at the injection sites.   Medications: You may resume your usual medications, including aspirin or ibuprofen.    Common Things to Expect  -  Compression must be worn for the first 24 hours and then during the day for 5-7 days.    -  If larger veins are treated with ultrasound-guided sclerotherapy, you will have redness, firmness, tenderness, and swelling.  This firmness and tenderness may take 3-6 months to resolve. Ibuprofen and compression hose will aid in this process.    -  You will have bruising that can last up to 3 weeks. Most fading of the veins will occur between 3 and 6 weeks after treatment.    -  You may notice brown discoloration (hyperpigmentation) at the treatment site.  This should fade with time, but will take 3 months to 1 year to fully heal.     -  Some  treated veins may look darker because of trapped blood within the vein. This trapped blood can be removed at a minimum of 1 month following treatment. Larger veins are more likely to develop trapped blood.    -  It is very important for you to use at least SPF 30 sunscreen in order to help prevent the discoloration of your skin.    -  Migraines rarely occur following sclerotherapy, but are more likely in patients with a history of migraines.  Treat as you would any other migraine.

## 2022-12-20 NOTE — PROGRESS NOTES
December 20, 2022    Vein Clinic Postoperative Nurse Note    Patient is here for her 72 hour postoperative visit.    Procedure: Right leg VNUS closure GSV, ASV(med nec), sclero ($), Left leg VNUS closure GSV, ASV(med nec)  Procedure Date: 12/15/22  Surgeon: Dr. Mena    Ultrasound Result: The right GSV is closed 12.6mm from SFJ to mid calf. The right ASV is closed 6.7mm from SFJ to distal thigh. The left GSV is closed 17.2mm from SFJ to knee. The left ASV is closed 13.7mm from SFJ to distal thigh. No evidence of BLE DVT.    Physical Exam: Incisions are approximated without signs of infection.  Ecchymosis: minimal to access sites  Swelling: minimal  Paresthesia: pt stated she does have numbness to her right medial knee and a little numbness to her left medial knee    Patient Questions or Concerns: Pt is doing well and has no concerns.    Pt is able to don her thigh high compression hose.    Reviewed postoperative instructions with patient and provided her with written material of common things to expect from her procedure.    Patient's Next Vein Clinic Appointment: 6 week post op and U/S sclero (med nec) with Dr. Mena (1/27/23).    Corie Angeles RN  [facility]

## 2022-12-20 NOTE — LETTER
12/20/2022         RE: Dominiuqe Arias  [address] [place] [address]        Dear Colleague,    Thank you for referring your patient, Dominique Arias, to the [facility]. Please see a copy of my visit note below.      December 20, 2022    Vein Clinic Postoperative Nurse Note    Patient is here for her 72 hour postoperative visit.    Procedure: Right leg VNUS closure GSV, ASV(med nec), sclero ($), Left leg VNUS closure GSV, ASV(med nec)  Procedure Date: 12/15/22  Surgeon: Dr. Mena    Ultrasound Result: The right GSV is closed 12.6mm from SFJ to mid calf. The right ASV is closed 6.7mm from SFJ to distal thigh. The left GSV is closed 17.2mm from SFJ to knee. The left ASV is closed 13.7mm from SFJ to distal thigh. No evidence of BLE DVT.    Physical Exam: Incisions are approximated without signs of infection.  Ecchymosis: minimal to access sites  Swelling: minimal  Paresthesia: pt stated she does have numbness to her right medial knee and a little numbness to her left medial knee    Patient Questions or Concerns: Pt is doing well and has no concerns.    Pt is able to don her thigh high compression hose.    Reviewed postoperative instructions with patient and provided her with written material of common things to expect from her procedure.    Patient's Next Vein Clinic Appointment: 6 week post op and U/S sclero (med nec) with Dr. Mena (1/27/23).    Corie Angeles RN  [facility]      Again, thank you for allowing me to participate in the care of your patient.        Sincerely,         Vein Nurse

## 2023-01-27 ENCOUNTER — OFFICE VISIT (OUTPATIENT)
Dept: VASCULAR SURGERY | Facility: CLINIC | Age: 48
End: 2023-01-27
Payer: COMMERCIAL

## 2023-01-27 DIAGNOSIS — I83.813 VARICOSE VEINS OF BOTH LOWER EXTREMITIES WITH PAIN: ICD-10-CM

## 2023-01-27 DIAGNOSIS — I83.812 VARICOSE VEINS OF LEFT LOWER EXTREMITY WITH PAIN: ICD-10-CM

## 2023-01-27 DIAGNOSIS — Z09 POSTOP CHECK: Primary | ICD-10-CM

## 2023-01-27 DIAGNOSIS — I83.811 VARICOSE VEINS OF RIGHT LOWER EXTREMITY WITH PAIN: ICD-10-CM

## 2023-01-27 PROCEDURE — 76942 ECHO GUIDE FOR BIOPSY: CPT | Performed by: SURGERY

## 2023-01-27 PROCEDURE — 36471 NJX SCLRSNT MLT INCMPTNT VN: CPT | Mod: 50 | Performed by: SURGERY

## 2023-01-27 NOTE — PATIENT INSTRUCTIONS
SCLEROTHERAPY AFTER CARE  Immediately:  After treatment, walk for 10-15 minutes before getting in your car.  If your trip home is more than 1 hour, stop and walk around for 5-10 minutes. Avoid sitting or standing for extended periods.   First 24 hours: Wear your compression continuously, even while in bed. After the 24 hours, you may shower if you want to. Put your hose back on, unless you are going to bed. You should NOT wear compression to bed after the first 24 hours. You may fly the next day, but wear your compression.   For 5 days: Wear the compression hose for waking hours only. You may continue to wear them longer than 5 days if you prefer.   For days 5-7: Walking is encouraged, as it promotes efficient circulation in your veins. You may do activities that raise your heart rate, but do NOT run, jog, do high impact aerobics, or weight lifting. After 7 days, no activity restrictions.    Shaving: Wait a few days to shave or apply lotion.   Bathing: Do NOT take hot baths or sit in a hot tub for 7-10 days.    For 1 year: Wear SPF 30 sunscreen on your legs when in the sun. This is very important! It helps prevent darkening of the skin at the injection sites.   Medications: You may resume your usual medications, including aspirin or ibuprofen.    Common Things to Expect  -  Compression must be worn for the first 24 hours and then during the day for 5-7 days.    -  If larger veins are treated with ultrasound-guided sclerotherapy, you will have redness, firmness, tenderness, and swelling.  This firmness and tenderness may take 3-6 months to resolve. Ibuprofen and compression hose will aid in this process.    -  You will have bruising that can last up to 3 weeks. Most fading of the veins will occur between 3 and 6 weeks after treatment.    -  You may notice brown discoloration (hyperpigmentation) at the treatment site.  This should fade with time, but will take 3 months to 1 year to fully heal.     -  Some treated  veins may look darker because of trapped blood within the vein. This trapped blood can be removed at a minimum of 1 month following treatment. Larger veins are more likely to develop trapped blood.    -  It is very important for you to use at least SPF 30 sunscreen in order to help prevent the discoloration of your skin.    -  Migraines rarely occur following sclerotherapy, but are more likely in patients with a history of migraines.  Treat as you would any other migraine.    stated

## 2023-01-27 NOTE — PROGRESS NOTES
Vein Clinic Sclerotherapy Note     Indications:  Residual, bilateral lower extremity pain and varicose veins; status post bilateral great saphenous and anterior accessory saphenous vein ablations     Procedure:  1.  Medically necessary sclerotherapy multiple right anterior accessory saphenous and great saphenous vein varicosities  2.  Medically necessary sclerotherapy multiple left anterior accessory saphenous and great saphenous vein varicosities     Procedure description  Details of procedure including risks of allergic reaction, deep vein thrombosis, ulceration, superficial thrombophlebitis and hyperpigmentation were discussed.  The patient voiced understanding and wished to proceed.  Informed consent was obtained.    I imaged the right anterior thigh and noted the tributaries coursing from the right anterior accessory saphenous vein in the proximal thigh.  I marked these coursing down the anterior thigh, down the anterolateral distal thigh and lateral to the right knee as well as onto the medial aspect of the right thigh with an indelible marker.  I then injected all of these veins at multiple levels from the proximal thigh to the lateral aspect of the right knee and onto the medial aspect of the right thigh using 1% polidocanol foam.  We used a total of 3 mL of foam to inject all of these veins.    I then isolated varicosities coursing along the left distal anteromedial thigh and onto the proximal lateral left leg with ultrasound, directed a 27-gauge needle into them and injected 1% polidocanol foam.  I then had her return prone and injected varicosities on the posterior aspect of her left thigh from the proximal thigh coursing across the thigh and onto the medial and lateral thighs under ultrasound guidance.  We used a total of 2 syringes of 1% polidocanol foam to treat these veins.  I had the patient perform ankle pumps.    There were a few areas of trapped blood and the varicosities on the anterolateral  legs bilaterally from previous cosmetic sclerotherapy.  I cleaned these with alcohol, made stab wounds with an 18-gauge needle and expressed thrombus.    We cleaned her legs, had her don compression, then had a walk for 10 minutes.  She will return in approximate 6 weeks in follow-up for both medically necessary sclerotherapy for any residual, symptomatic varicosities and direct vision, cosmetic sclerotherapy for spider telangiectasias.    Sclerotherapy    Date/Time: 1/27/2023 5:00 PM  Performed by: Tito Mena MD  Authorized by: Tito Mena MD     Time out: Immediately prior to the procedure a time out was called    Type:  Medically Necessary  Vein:  Multiple Veins  Yes    Procedure side:  Right  Solution/Amount:  1% POLIDOCANOL  Syringes:  3 syringes (9 mL 1% polidocanol foam)  Patient tolerance:  Patient tolerated the procedure well with no immediate complications  Wrap/Hose:  Hose  Sclerotherapy    Date/Time: 1/27/2023 5:00 PM  Performed by: Tito Mena MD  Authorized by: Tito Mena MD     Type:  Medically Necessary  Vein:  Multiple Veins  Yes    Procedure side:  Left  Solution/Amount:  1% POLIDOCANOL  Syringes:  2 syringes (6 mL 1% polidocanol foam)  Evacuation of intraluminal thrombus under sterile conditions without complications.    Patient tolerance:  Patient tolerated the procedure well with no immediate complications  Wrap/Hose:  Kenji Mena MD    Dictated using Dragon voice recognition software which may result in transcription errors

## 2023-01-27 NOTE — LETTER
1/27/2023         RE: Dominique Arias  [address] [place] [address]        Dear Colleague,    Thank you for referring your patient, Dominique Arias, to the [facility] VEIN [facility] [place]. Please see a copy of my visit note below.        Vein Clinic Sclerotherapy Note     Indications:  Residual, bilateral lower extremity pain and varicose veins; status post bilateral great saphenous and anterior accessory saphenous vein ablations     Procedure:  1.  Medically necessary sclerotherapy multiple right anterior accessory saphenous and great saphenous vein varicosities  2.  Medically necessary sclerotherapy multiple left anterior accessory saphenous and great saphenous vein varicosities     Procedure description  Details of procedure including risks of allergic reaction, deep vein thrombosis, ulceration, superficial thrombophlebitis and hyperpigmentation were discussed.  The patient voiced understanding and wished to proceed.  Informed consent was obtained.    I imaged the right anterior thigh and noted the tributaries coursing from the right anterior accessory saphenous vein in the proximal thigh.  I marked these coursing down the anterior thigh, down the anterolateral distal thigh and lateral to the right knee as well as onto the medial aspect of the right thigh with an indelible marker.  I then injected all of these veins at multiple levels from the proximal thigh to the lateral aspect of the right knee and onto the medial aspect of the right thigh using 1% polidocanol foam.  We used a total of 3 mL of foam to inject all of these veins.    I then isolated varicosities coursing along the left distal anteromedial thigh and onto the proximal lateral left leg with ultrasound, directed a 27-gauge needle into them and injected 1% polidocanol foam.  I then had her return prone and injected varicosities on the posterior aspect of her left thigh from the proximal thigh coursing across the thigh and onto  the medial and lateral thighs under ultrasound guidance.  We used a total of 2 syringes of 1% polidocanol foam to treat these veins.  I had the patient perform ankle pumps.    There were a few areas of trapped blood and the varicosities on the anterolateral legs bilaterally from previous cosmetic sclerotherapy.  I cleaned these with alcohol, made stab wounds with an 18-gauge needle and expressed thrombus.    We cleaned her legs, had her don compression, then had a walk for 10 minutes.  She will return in approximate 6 weeks in follow-up for both medically necessary sclerotherapy for any residual, symptomatic varicosities and direct vision, cosmetic sclerotherapy for spider telangiectasias.    Sclerotherapy    Date/Time: 1/27/2023 5:00 PM  Performed by: Tito Mena MD  Authorized by: Tito Mena MD     Time out: Immediately prior to the procedure a time out was called    Type:  Medically Necessary  Vein:  Multiple Veins  Yes    Procedure side:  Right  Solution/Amount:  1% POLIDOCANOL  Syringes:  3 syringes (9 mL 1% polidocanol foam)  Patient tolerance:  Patient tolerated the procedure well with no immediate complications  Wrap/Hose:  Hose  Sclerotherapy    Date/Time: 1/27/2023 5:00 PM  Performed by: Tito Mena MD  Authorized by: Tito Mena MD     Type:  Medically Necessary  Vein:  Multiple Veins  Yes    Procedure side:  Left  Solution/Amount:  1% POLIDOCANOL  Syringes:  2 syringes (6 mL 1% polidocanol foam)  Evacuation of intraluminal thrombus under sterile conditions without complications.    Patient tolerance:  Patient tolerated the procedure well with no immediate complications  Wrap/Hose:  Hose        AUSTEN Mena MD    Dictated using Dragon voice recognition software which may result in transcription errors      Again, thank you for allowing me to participate in the care of your patient.        Sincerely,        Tito Mena MD

## 2023-03-07 ENCOUNTER — OFFICE VISIT (OUTPATIENT)
Dept: VASCULAR SURGERY | Facility: CLINIC | Age: 48
End: 2023-03-07
Payer: COMMERCIAL

## 2023-03-07 DIAGNOSIS — I83.813 VARICOSE VEINS OF BOTH LOWER EXTREMITIES WITH PAIN: Primary | ICD-10-CM

## 2023-03-07 PROCEDURE — 99212 OFFICE O/P EST SF 10 MIN: CPT | Performed by: SURGERY

## 2023-03-07 NOTE — PROGRESS NOTES
The [facility] [place] office note  Dominique Arias returns in follow-up of medical necessary, ultrasound-guided sclerotherapy of bilateral anterior accessory saphenous vein tributaries and left great saphenous vein tributaries on 1/27/2023.  This was preceded by radiofrequency ablation of her right great saphenous vein, right anterior extensor saphenous vein, left great saphenous vein and left anterior accessory saphenous veins on 12/15/2022.  At the time of her initial procedure, we performed cosmetic sclerotherapy as well.    Overall the patient states that her legs feel much better and she is pleased with the improvement.  She would like to have some improvement with the spider veins from a cosmetic perspective.    Exam  Right lower extremity: There is hyperpigmentation and induration along the course of the right anterior accessory saphenous vein tributaries from the proximal third of the thigh, down the anterior thigh, distal anterolateral thigh.  There are also some trapped blood in a vein on the posteromedial left mid thigh.    These ears are cleaned with alcohol, stab wounds made with an 18-gauge needle and thrombus expressed.    We then cleaned her legs and placed cotton balls on some of the areas.    Assessment  Overall good result from the above procedure.  She will be traveling in 10 days, therefore I will not treat a residual varicosity on the distal anterolateral thigh or 1 is visible on the distal medial thigh.  She may return following her travel or she may wait to be reassessed in July when she returns for 6-month post procedure bilateral lower extremity venous ultrasound.    AUSTEN Mena MD, FACS    Dictated using Dragon voice recognition software which may result in transcription errors

## 2023-03-07 NOTE — LETTER
3/7/2023         RE: Dominique Arias  [address] [place] [address]        Dear Colleague,    Thank you for referring your patient, Dominique Arias, to the [facility] VEIN [facility] [place]. Please see a copy of my visit note below.    [facility] Vein [facility] office note  Dominique Arias returns in follow-up of medical necessary, ultrasound-guided sclerotherapy of bilateral anterior accessory saphenous vein tributaries and left great saphenous vein tributaries on 1/27/2023.  This was preceded by radiofrequency ablation of her right great saphenous vein, right anterior extensor saphenous vein, left great saphenous vein and left anterior accessory saphenous veins on 12/15/2022.  At the time of her initial procedure, we performed cosmetic sclerotherapy as well.    Overall the patient states that her legs feel much better and she is pleased with the improvement.  She would like to have some improvement with the spider veins from a cosmetic perspective.    Exam  Right lower extremity: There is hyperpigmentation and induration along the course of the right anterior accessory saphenous vein tributaries from the proximal third of the thigh, down the anterior thigh, distal anterolateral thigh.  There are also some trapped blood in a vein on the posteromedial left mid thigh.    These ears are cleaned with alcohol, stab wounds made with an 18-gauge needle and thrombus expressed.    We then cleaned her legs and placed cotton balls on some of the areas.    Assessment  Overall good result from the above procedure.  She will be traveling in 10 days, therefore I will not treat a residual varicosity on the distal anterolateral thigh or 1 is visible on the distal medial thigh.  She may return following her travel or she may wait to be reassessed in July when she returns for 6-month post procedure bilateral lower extremity venous ultrasound.    AUSTEN Mena MD, FACS    Dictated using Dragon voice recognition  software which may result in transcription errors          Again, thank you for allowing me to participate in the care of your patient.        Sincerely,        Tito Mena MD

## 2023-04-20 ENCOUNTER — OFFICE VISIT (OUTPATIENT)
Dept: VASCULAR SURGERY | Facility: CLINIC | Age: 48
End: 2023-04-20
Payer: COMMERCIAL

## 2023-04-20 DIAGNOSIS — I83.811 VARICOSE VEINS OF RIGHT LOWER EXTREMITY WITH PAIN: ICD-10-CM

## 2023-04-20 DIAGNOSIS — I78.1 SPIDER TELANGIECTASIS OF SKIN: Primary | ICD-10-CM

## 2023-04-20 DIAGNOSIS — I83.812 VARICOSE VEINS OF LEFT LOWER EXTREMITY WITH PAIN: ICD-10-CM

## 2023-04-20 PROCEDURE — 36471 NJX SCLRSNT MLT INCMPTNT VN: CPT | Mod: RT | Performed by: SURGERY

## 2023-04-20 PROCEDURE — S9999 SALES TAX: HCPCS | Performed by: SURGERY

## 2023-04-20 PROCEDURE — 76942 ECHO GUIDE FOR BIOPSY: CPT | Mod: RT | Performed by: SURGERY

## 2023-04-20 PROCEDURE — 36468 NJX SCLRSNT SPIDER VEINS: CPT | Performed by: SURGERY

## 2023-04-20 NOTE — PROGRESS NOTES
Vein Clinic Sclerotherapy Note     Indications:  1.  Right thigh pain secondary to varicose veins  2.  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  Medically necessary, ultrasound-guided sclerotherapy right anterior thigh varicose veins  2.  Bilateral lower extremity cosmetic sclerotherapy     Procedure description  Details of procedure including risks of allergic reaction, deep vein thrombosis, ulceration, superficial thrombophlebitis and hyperpigmentation were discussed.  The patient voiced understanding and wished to proceed.  Informed consent was obtained.    Medically necessary sclerotherapy  I imaged the right anterior thigh and noted large varicosities coursing from the mid thigh down the anteromedial thigh to just above the knee.  I also noted a varicosity extending cephalad from this area as well.  I imaged the varicosity extending cephalad midway between the mid thigh and the groin, directed a 27-gauge needle in the vein under ultrasound guidance and injected 1% polidocanol foam.  I then moved down just distal to the mid thigh and injected the branch coursing distally and medially using 1% polidocanol foam under ultrasound guidance.    Cosmetic sclerotherapy  I donned the Syris headlight and injected telangiectasias over the thighs and legs circumferentially using 0.5% polidocanol foam.  I used a total of 3 syringes (9 mL) of 0.5% polidocanol foam.  The patient tolerated this well.  I had a perform ankle pumps.    There was an area of trapped blood on the mid anterior right thigh which I cleaned with alcohol, made a stab wound with an 18-gauge needle and expressed thrombus.    We cleaned her legs, had her don compression hose, then had a walk for 10 minutes.  She returned in about 6 weeks in follow-up.  I would like to image her anterior thigh to make sure that these veins are closed and also to make sure that there is no trapped blood.  She may want another  session of cosmetic sclerotherapy as well.    Sclerotherapy    Date/Time: 4/20/2023 5:46 PM    Performed by: Tito Mena MD  Authorized by: Tito Mena MD    Time out: Immediately prior to the procedure a time out was called    Type:  Medically Necessary  Vein:  Multiple Veins  Yes    Procedure side:  Right  Solution/Amount:  1% POLIDOCANOL  Syringes:  1 syringe (3 mL) 1% polidocanol foam  Patient tolerance:  Patient tolerated the procedure well with no immediate complications  Wrap/Hose:  Hose  Sclerotherapy    Date/Time: 4/20/2023 5:47 PM    Performed by: Tito Mena MD  Authorized by: Tito Mena MD    Type:  Cosmetic  Session:  Full  Procedure side:  Bilateral  Solution/Amount:  .5 POLIDOCANOL  Syringes:  3  Evacuation of intraluminal thrombus under sterile conditions without complications.    Patient tolerance:  Patient tolerated the procedure well with no immediate complications  Wrap/Hose:  Kenji Mena MD    Dictated using Dragon voice recognition software which may result in transcription errors

## 2023-04-20 NOTE — LETTER
4/20/2023         RE: Dominique Arias  [address] [place] [address]        Dear Colleague,    Thank you for referring your patient, Dominique Arias, to the [facility] VEIN CLINIC [place]. Please see a copy of my visit note below.        Vein Clinic Sclerotherapy Note     Indications:  1.  Right thigh pain secondary to varicose veins  2.  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  Medically necessary, ultrasound-guided sclerotherapy right anterior thigh varicose veins  2.  Bilateral lower extremity cosmetic sclerotherapy     Procedure description  Details of procedure including risks of allergic reaction, deep vein thrombosis, ulceration, superficial thrombophlebitis and hyperpigmentation were discussed.  The patient voiced understanding and wished to proceed.  Informed consent was obtained.    Medically necessary sclerotherapy  I imaged the right anterior thigh and noted large varicosities coursing from the mid thigh down the anteromedial thigh to just above the knee.  I also noted a varicosity extending cephalad from this area as well.  I imaged the varicosity extending cephalad midway between the mid thigh and the groin, directed a 27-gauge needle in the vein under ultrasound guidance and injected 1% polidocanol foam.  I then moved down just distal to the mid thigh and injected the branch coursing distally and medially using 1% polidocanol foam under ultrasound guidance.    Cosmetic sclerotherapy  I donned the Syris headlight and injected telangiectasias over the thighs and legs circumferentially using 0.5% polidocanol foam.  I used a total of 3 syringes (9 mL) of 0.5% polidocanol foam.  The patient tolerated this well.  I had a perform ankle pumps.    There was an area of trapped blood on the mid anterior right thigh which I cleaned with alcohol, made a stab wound with an 18-gauge needle and expressed thrombus.    We cleaned her legs, had her  don compression hose, then had a walk for 10 minutes.  She returned in about 6 weeks in follow-up.  I would like to image her anterior thigh to make sure that these veins are closed and also to make sure that there is no trapped blood.  She may want another session of cosmetic sclerotherapy as well.    Sclerotherapy    Date/Time: 4/20/2023 5:46 PM    Performed by: Tito Mena MD  Authorized by: Tito Mena MD    Time out: Immediately prior to the procedure a time out was called    Type:  Medically Necessary  Vein:  Multiple Veins  Yes    Procedure side:  Right  Solution/Amount:  1% POLIDOCANOL  Syringes:  1 syringe (3 mL) 1% polidocanol foam  Patient tolerance:  Patient tolerated the procedure well with no immediate complications  Wrap/Hose:  Hose  Sclerotherapy    Date/Time: 4/20/2023 5:47 PM    Performed by: Tito Mena MD  Authorized by: Tito Mena MD    Type:  Cosmetic  Session:  Full  Procedure side:  Bilateral  Solution/Amount:  .5 POLIDOCANOL  Syringes:  3  Evacuation of intraluminal thrombus under sterile conditions without complications.    Patient tolerance:  Patient tolerated the procedure well with no immediate complications  Wrap/Hose:  Hose        AUSTEN Mena MD    Dictated using Dragon voice recognition software which may result in transcription errors      Again, thank you for allowing me to participate in the care of your patient.        Sincerely,        Tito Mena MD

## 2023-06-13 ENCOUNTER — OFFICE VISIT (OUTPATIENT)
Dept: VASCULAR SURGERY | Facility: CLINIC | Age: 48
End: 2023-06-13
Payer: COMMERCIAL

## 2023-06-23 ENCOUNTER — LAB REQUISITION (OUTPATIENT)
Dept: LAB | Facility: CLINIC | Age: 48
End: 2023-06-23

## 2023-06-23 DIAGNOSIS — Z01.419 ENCOUNTER FOR GYNECOLOGICAL EXAMINATION (GENERAL) (ROUTINE) WITHOUT ABNORMAL FINDINGS: ICD-10-CM

## 2023-06-23 LAB
ALBUMIN SERPL BCG-MCNC: 4.4 G/DL (ref 3.5–5.2)
ALP SERPL-CCNC: 73 U/L (ref 35–104)
ALT SERPL W P-5'-P-CCNC: 23 U/L (ref 0–50)
ANION GAP SERPL CALCULATED.3IONS-SCNC: 11 MMOL/L (ref 7–15)
AST SERPL W P-5'-P-CCNC: 27 U/L (ref 0–45)
BILIRUB SERPL-MCNC: 0.2 MG/DL
BUN SERPL-MCNC: 17.4 MG/DL (ref 6–20)
CALCIUM SERPL-MCNC: 9.4 MG/DL (ref 8.6–10)
CHLORIDE SERPL-SCNC: 108 MMOL/L (ref 98–107)
CHOLEST SERPL-MCNC: 221 MG/DL
CREAT SERPL-MCNC: 0.81 MG/DL (ref 0.51–0.95)
DEPRECATED HCO3 PLAS-SCNC: 22 MMOL/L (ref 22–29)
GFR SERPL CREATININE-BSD FRML MDRD: 88 ML/MIN/1.73M2
GLUCOSE SERPL-MCNC: 105 MG/DL (ref 70–99)
HDLC SERPL-MCNC: 51 MG/DL
LDLC SERPL CALC-MCNC: 121 MG/DL
NONHDLC SERPL-MCNC: 170 MG/DL
POTASSIUM SERPL-SCNC: 4.7 MMOL/L (ref 3.4–5.3)
PROT SERPL-MCNC: 7.2 G/DL (ref 6.4–8.3)
SODIUM SERPL-SCNC: 141 MMOL/L (ref 136–145)
TRIGL SERPL-MCNC: 243 MG/DL

## 2023-06-23 PROCEDURE — 80053 COMPREHEN METABOLIC PANEL: CPT | Performed by: OBSTETRICS & GYNECOLOGY

## 2023-06-23 PROCEDURE — 80061 LIPID PANEL: CPT | Performed by: OBSTETRICS & GYNECOLOGY

## 2023-07-11 ENCOUNTER — OFFICE VISIT (OUTPATIENT)
Dept: VASCULAR SURGERY | Facility: CLINIC | Age: 48
End: 2023-07-11
Attending: SURGERY
Payer: COMMERCIAL

## 2023-07-11 ENCOUNTER — ANCILLARY PROCEDURE (OUTPATIENT)
Dept: ULTRASOUND IMAGING | Facility: CLINIC | Age: 48
End: 2023-07-11
Attending: SURGERY
Payer: COMMERCIAL

## 2023-07-11 DIAGNOSIS — I83.813 VARICOSE VEINS OF BOTH LOWER EXTREMITIES WITH PAIN: ICD-10-CM

## 2023-07-11 DIAGNOSIS — I83.813 VARICOSE VEINS OF BOTH LOWER EXTREMITIES WITH PAIN: Primary | ICD-10-CM

## 2023-07-11 PROCEDURE — 93970 EXTREMITY STUDY: CPT | Performed by: SURGERY

## 2023-07-11 PROCEDURE — 99213 OFFICE O/P EST LOW 20 MIN: CPT | Performed by: SURGERY

## 2023-07-11 NOTE — LETTER
7/11/2023         RE: Dominique Arias  [address] [place] [address]        Dear Colleague,    Thank you for referring your patient, Dominique Arias, to the [facility] VEIN CLINIC [place]. Please see a copy of my visit note below.    [facility] Vein [facility] 6-month postop note    Dominique Arias returns in 6-month follow-up of radiofrequency ablation of her bilateral great saphenous veins and her anterior sensory saphenous veins.  Overall she is doing well and has no complaints.    Exam  No significant visible varicose veins on either lower extremity.    On her right anterior thigh is some ecchymosis along the course of sclerotherapy of right anterior accessory saphenous vein tributaries.  There are a few scattered telangiectasias over the right leg.    Left lower extremity: Mild hyperpigmentation of the left anteromedial thigh from sclerotherapy of left anterior accessory and great saphenous vein tributaries.  Patch of telangiectasias of the anteromedial mid left leg.    Ultrasound  Right great saphenous vein is closed 23.8 mm from the saphenofemoral junction to the mid calf.  The right anterior accessory saphenous vein is closed 5.8 mm from the saphenofemoral junction to the mid thigh.    Left  great saphenous vein is closed 12.2 mm from the saphenofemoral junction to mid calf while the left anterior sensory saphenous vein is closed 21.4 mm from the saphenofemoral junction to the mid thigh.    There appears to be acute occlusive thrombus in a tributary off of the right below-knee great saphenous vein.  This corresponds to the area of pain and tenderness.    Assessment  Overall doing well with good results following the above treatments.    Plan  She may return for cosmetic sclerotherapy for spider veins in the future.    AUSTEN Mena MD, FACS    Dictated using Dragon voice recognition software which may result in transcription errors      Again, thank you for allowing me to participate  in the care of your patient.        Sincerely,        Tito Mena MD

## 2023-07-11 NOTE — PROGRESS NOTES
[facility] Vein Clinic [place] 6-month postop note    Dominique Arias returns in 6-month follow-up of radiofrequency ablation of her bilateral great saphenous veins and her anterior sensory saphenous veins.  Overall she is doing well and has no complaints.    Exam  No significant visible varicose veins on either lower extremity.    On her right anterior thigh is some ecchymosis along the course of sclerotherapy of right anterior accessory saphenous vein tributaries.  There are a few scattered telangiectasias over the right leg.    Left lower extremity: Mild hyperpigmentation of the left anteromedial thigh from sclerotherapy of left anterior accessory and great saphenous vein tributaries.  Patch of telangiectasias of the anteromedial mid left leg.    Ultrasound  Right great saphenous vein is closed 23.8 mm from the saphenofemoral junction to the mid calf.  The right anterior accessory saphenous vein is closed 5.8 mm from the saphenofemoral junction to the mid thigh.    Left  great saphenous vein is closed 12.2 mm from the saphenofemoral junction to mid calf while the left anterior sensory saphenous vein is closed 21.4 mm from the saphenofemoral junction to the mid thigh.    There appears to be acute occlusive thrombus in a tributary off of the right below-knee great saphenous vein.  This corresponds to the area of pain and tenderness.    Assessment  Overall doing well with good results following the above treatments.    Plan  She may return for cosmetic sclerotherapy for spider veins in the future.    C Jesus Mena MD, FACS    Dictated using Dragon voice recognition software which may result in transcription errors

## 2023-07-15 ENCOUNTER — HEALTH MAINTENANCE LETTER (OUTPATIENT)
Age: 48
End: 2023-07-15

## 2023-11-09 ENCOUNTER — OFFICE VISIT (OUTPATIENT)
Dept: VASCULAR SURGERY | Facility: CLINIC | Age: 48
End: 2023-11-09
Payer: COMMERCIAL

## 2023-11-09 DIAGNOSIS — I78.1 SPIDER VEINS: Primary | ICD-10-CM

## 2023-11-09 DIAGNOSIS — I78.1 SPIDER TELANGIECTASIS OF SKIN: ICD-10-CM

## 2023-11-09 PROCEDURE — S9999 SALES TAX: HCPCS | Performed by: SURGERY

## 2023-11-09 PROCEDURE — 36468 NJX SCLRSNT SPIDER VEINS: CPT | Performed by: SURGERY

## 2023-11-09 NOTE — LETTER
11/9/2023         RE: Dominique Arias  [address] [place] [address]        Dear Colleague,    Thank you for referring your patient, Dominique Arias, to the [facility] VEIN CLINIC [place]. Please see a copy of my visit note below.        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  The patient voiced understanding and wished to proceed.  Informed consent was obtained.    I donned the Syris headlight and injected telangiectasias which were numerous over the anterior and anterolateral thighs more than the legs below the knees.  There were a few scattered down to the ankles, however.    At the patient turn prone and noted significant telangiectasias and reticular veins in her popliteal fossa's bilaterally.  I injected the majority of these veins in the popliteal fossas but some were not injected due to the fact that I had used up allotted time.  I had the patient perform ankle pumps after using 4 syringes of 0.5% polidocanol foam.    We cleaned her legs, had her don compression and then had her walk for 10 minutes.  She will return in approximate 6 weeks in follow-up.  We will likely begin by treating the posterior legs at her next visit    Sclerotherapy    Date/Time: 11/9/2023 6:12 PM    Performed by: Tito Mena MD  Authorized by: Tito Mena MD    Time out: Immediately prior to the procedure a time out was called    Type:  Cosmetic  Session:  Full  Procedure side:  Bilateral  Solution/Amount:  .5 POLIDOCANOL  Syringes:  4  Patient tolerance:  Patient tolerated the procedure well with no immediate complications  Wrap/Hose:   Kenji Mena MD    Dictated using Dragon voice recognition software which may result in transcription errors      Again, thank you for allowing me to participate in the care of your patient.        Sincerely,        Tito Mena MD

## 2023-11-10 NOTE — PROGRESS NOTES
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  The patient voiced understanding and wished to proceed.  Informed consent was obtained.    I donned the Syris headlight and injected telangiectasias which were numerous over the anterior and anterolateral thighs more than the legs below the knees.  There were a few scattered down to the ankles, however.    At the patient turn prone and noted significant telangiectasias and reticular veins in her popliteal fossa's bilaterally.  I injected the majority of these veins in the popliteal fossas but some were not injected due to the fact that I had used up allotted time.  I had the patient perform ankle pumps after using 4 syringes of 0.5% polidocanol foam.    We cleaned her legs, had her don compression and then had her walk for 10 minutes.  She will return in approximate 6 weeks in follow-up.  We will likely begin by treating the posterior legs at her next visit    Sclerotherapy    Date/Time: 11/9/2023 6:12 PM    Performed by: Tito Mena MD  Authorized by: Tito Mena MD    Time out: Immediately prior to the procedure a time out was called    Type:  Cosmetic  Session:  Full  Procedure side:  Bilateral  Solution/Amount:  .5 POLIDOCANOL  Syringes:  4  Patient tolerance:  Patient tolerated the procedure well with no immediate complications  Wrap/Hose:  Kenji Mena MD    Dictated using Dragon voice recognition software which may result in transcription errors

## 2024-04-02 ENCOUNTER — TELEPHONE (OUTPATIENT)
Dept: VASCULAR SURGERY | Facility: CLINIC | Age: 49
End: 2024-04-02
Payer: COMMERCIAL

## 2024-04-02 NOTE — TELEPHONE ENCOUNTER
Veins RN-    Please call patient and eval bilateral numbness noted at 6 mo PO.    Last saw CPN 7/11/23: Bilateral medial knee numbness improving.  DOS 12/15/22: Rt GSV/AASV VNUS and Lt GSV/AASV VNUS

## 2024-04-02 NOTE — TELEPHONE ENCOUNTER
Called and LVM to follow up on bilateral numbness noted post procedure. Instructed patient to call back to provide update. Clinic number provided.

## 2024-09-07 ENCOUNTER — HEALTH MAINTENANCE LETTER (OUTPATIENT)
Age: 49
End: 2024-09-07

## 2024-11-08 ENCOUNTER — LAB REQUISITION (OUTPATIENT)
Dept: LAB | Facility: CLINIC | Age: 49
End: 2024-11-08
Payer: COMMERCIAL

## 2024-11-08 DIAGNOSIS — L08.9 LOCAL INFECTION OF THE SKIN AND SUBCUTANEOUS TISSUE, UNSPECIFIED: ICD-10-CM

## 2024-11-08 PROCEDURE — 87077 CULTURE AEROBIC IDENTIFY: CPT | Mod: ORL | Performed by: DERMATOLOGY

## 2024-11-10 LAB — BACTERIA WND CULT: ABNORMAL

## 2025-06-18 ENCOUNTER — LAB REQUISITION (OUTPATIENT)
Dept: LAB | Facility: CLINIC | Age: 50
End: 2025-06-18

## 2025-06-18 DIAGNOSIS — R21 RASH AND OTHER NONSPECIFIC SKIN ERUPTION: ICD-10-CM

## 2025-06-18 LAB
ALBUMIN SERPL BCG-MCNC: 4.3 G/DL (ref 3.5–5.2)
ALP SERPL-CCNC: 63 U/L (ref 40–150)
ALT SERPL W P-5'-P-CCNC: 19 U/L (ref 0–50)
ANION GAP SERPL CALCULATED.3IONS-SCNC: 10 MMOL/L (ref 7–15)
AST SERPL W P-5'-P-CCNC: 25 U/L (ref 0–45)
BILIRUB SERPL-MCNC: 0.2 MG/DL
BUN SERPL-MCNC: 11.2 MG/DL (ref 6–20)
CALCIUM SERPL-MCNC: 9.3 MG/DL (ref 8.8–10.4)
CHLORIDE SERPL-SCNC: 104 MMOL/L (ref 98–107)
CREAT SERPL-MCNC: 0.7 MG/DL (ref 0.51–0.95)
EGFRCR SERPLBLD CKD-EPI 2021: >90 ML/MIN/1.73M2
ERYTHROCYTE [DISTWIDTH] IN BLOOD BY AUTOMATED COUNT: 12.5 % (ref 10–15)
EST. AVERAGE GLUCOSE BLD GHB EST-MCNC: 111 MG/DL
GLUCOSE SERPL-MCNC: 95 MG/DL (ref 70–99)
HBA1C MFR BLD: 5.5 %
HCO3 SERPL-SCNC: 25 MMOL/L (ref 22–29)
HCT VFR BLD AUTO: 38.3 % (ref 35–47)
HGB BLD-MCNC: 12.3 G/DL (ref 11.7–15.7)
MCH RBC QN AUTO: 28.5 PG (ref 26.5–33)
MCHC RBC AUTO-ENTMCNC: 32.1 G/DL (ref 31.5–36.5)
MCV RBC AUTO: 89 FL (ref 78–100)
PLATELET # BLD AUTO: 245 10E3/UL (ref 150–450)
POTASSIUM SERPL-SCNC: 4.3 MMOL/L (ref 3.4–5.3)
PROT SERPL-MCNC: 6.9 G/DL (ref 6.4–8.3)
RBC # BLD AUTO: 4.31 10E6/UL (ref 3.8–5.2)
SODIUM SERPL-SCNC: 139 MMOL/L (ref 135–145)
T4 FREE SERPL-MCNC: 0.63 NG/DL (ref 0.9–1.7)
TSH SERPL DL<=0.005 MIU/L-ACNC: 4.74 UIU/ML (ref 0.3–4.2)
WBC # BLD AUTO: 5.4 10E3/UL (ref 4–11)

## 2025-06-18 PROCEDURE — 84443 ASSAY THYROID STIM HORMONE: CPT | Performed by: OBSTETRICS & GYNECOLOGY

## 2025-06-18 PROCEDURE — 84439 ASSAY OF FREE THYROXINE: CPT | Performed by: OBSTETRICS & GYNECOLOGY

## 2025-06-18 PROCEDURE — 83036 HEMOGLOBIN GLYCOSYLATED A1C: CPT | Performed by: OBSTETRICS & GYNECOLOGY

## 2025-06-18 PROCEDURE — 85027 COMPLETE CBC AUTOMATED: CPT | Performed by: OBSTETRICS & GYNECOLOGY

## 2025-06-18 PROCEDURE — 84155 ASSAY OF PROTEIN SERUM: CPT | Performed by: OBSTETRICS & GYNECOLOGY

## 2025-06-18 PROCEDURE — 86038 ANTINUCLEAR ANTIBODIES: CPT | Performed by: OBSTETRICS & GYNECOLOGY

## 2025-06-19 LAB — ANA SER QL IF: NEGATIVE

## 2025-07-17 ENCOUNTER — LAB REQUISITION (OUTPATIENT)
Dept: LAB | Facility: CLINIC | Age: 50
End: 2025-07-17

## 2025-07-17 DIAGNOSIS — L91.8 OTHER HYPERTROPHIC DISORDERS OF THE SKIN: ICD-10-CM

## 2025-07-17 PROCEDURE — 88304 TISSUE EXAM BY PATHOLOGIST: CPT | Performed by: PATHOLOGY

## 2025-07-21 LAB
PATH REPORT.COMMENTS IMP SPEC: NORMAL
PATH REPORT.COMMENTS IMP SPEC: NORMAL
PATH REPORT.FINAL DX SPEC: NORMAL
PATH REPORT.GROSS SPEC: NORMAL
PATH REPORT.MICROSCOPIC SPEC OTHER STN: NORMAL
PATH REPORT.RELEVANT HX SPEC: NORMAL
PHOTO IMAGE: NORMAL

## 2025-08-02 ENCOUNTER — HEALTH MAINTENANCE LETTER (OUTPATIENT)
Age: 50
End: 2025-08-02